# Patient Record
Sex: FEMALE | Race: WHITE | NOT HISPANIC OR LATINO | Employment: STUDENT | URBAN - METROPOLITAN AREA
[De-identification: names, ages, dates, MRNs, and addresses within clinical notes are randomized per-mention and may not be internally consistent; named-entity substitution may affect disease eponyms.]

---
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
## 2023-03-22 ENCOUNTER — MYC REFILL (OUTPATIENT)
Dept: FAMILY MEDICINE | Facility: CLINIC | Age: 18
End: 2023-03-22

## 2023-03-23 NOTE — TELEPHONE ENCOUNTER
Pending Prescriptions:                       Disp   Refills    FLUoxetine (PROZAC) 20 MG capsule                          Sig: Take 2 capsules (40 mg) by mouth daily    busPIRone (BUSPAR) 10 MG tablet                            Sig: Take 1 tablet (10 mg) by mouth 2 times daily    Routing refill request to provider for review/approval because:  Medication is reported/historical

## 2023-03-24 RX ORDER — BUSPIRONE HYDROCHLORIDE 10 MG/1
10 TABLET ORAL 2 TIMES DAILY
Status: CANCELLED | OUTPATIENT
Start: 2023-03-24

## 2023-03-24 NOTE — TELEPHONE ENCOUNTER
These are prescribed by her psychiatrist.    Janie Tai, Pediatric Nurse Practitioner   [facility]

## 2023-05-18 ENCOUNTER — OFFICE VISIT (OUTPATIENT)
Dept: FAMILY MEDICINE | Facility: CLINIC | Age: 18
End: 2023-05-18
Payer: COMMERCIAL

## 2023-05-18 VITALS
RESPIRATION RATE: 14 BRPM | BODY MASS INDEX: 30.82 KG/M2 | WEIGHT: 185 LBS | TEMPERATURE: 99.1 F | OXYGEN SATURATION: 100 % | HEART RATE: 104 BPM | DIASTOLIC BLOOD PRESSURE: 74 MMHG | SYSTOLIC BLOOD PRESSURE: 122 MMHG | HEIGHT: 65 IN

## 2023-05-18 DIAGNOSIS — N91.2 AMENORRHEA: Primary | ICD-10-CM

## 2023-05-18 DIAGNOSIS — Z32.01 PREGNANCY TEST POSITIVE: ICD-10-CM

## 2023-05-18 LAB — HCG UR QL: POSITIVE

## 2023-05-18 PROCEDURE — 81025 URINE PREGNANCY TEST: CPT | Performed by: NURSE PRACTITIONER

## 2023-05-18 PROCEDURE — 99213 OFFICE O/P EST LOW 20 MIN: CPT | Performed by: NURSE PRACTITIONER

## 2023-05-18 NOTE — PROGRESS NOTES
"  Assessment & Plan     Amenorrhea    - HCG qualitative urine; Future  - HCG qualitative urine  - Ob/Gyn Referral; Future    Pregnancy test positive  Recommend starting prenatal, no alcohol.  Referred to Primary Care with Ob.     56}     BMI:   Estimated body mass index is 31.05 kg/m  as calculated from the following:    Height as of this encounter: 1.644 m (5' 4.72\").    Weight as of this encounter: 83.9 kg (185 lb).   Weight management plan: .        DEVANG Bass CNP [facility] Graves is a 17 year old, presenting for the following health issues:  Pregnancy Test        5/18/2023     2:47 PM   Additional Questions   Roomed by Willard COURTNEY   Accompanied by friend     History of Present Illness       Reason for visit:  Pregnancy test  Symptoms include:  Positive test at home last night. was suppost to start her period on 5/14/23. This was not planned.     She eats 2-3 servings of fruits and vegetables daily.She consumes 0 sweetened beverage(s) daily.She exercises with enough effort to increase her heart rate 30 to 60 minutes per day.  She exercises with enough effort to increase her heart rate 4 days per week. She is missing 1 dose(s) of medications per week.       Review of Systems   Constitutional, HEENT, cardiovascular, pulmonary, gi and gu systems are negative, except as otherwise noted.      Objective    /74   Pulse 104   Temp 99.1  F (37.3  C) (Temporal)   Resp 14   Ht 1.644 m (5' 4.72\")   Wt 83.9 kg (185 lb)   LMP 04/16/2023 (Exact Date)   SpO2 100%   BMI 31.05 kg/m    Body mass index is 31.05 kg/m .  Physical Exam   GENERAL: healthy, alert and no distress  NECK: no adenopathy, no asymmetry, masses, or scars and thyroid normal to palpation  RESP: lungs clear to auscultation - no rales, rhonchi or wheezes  CV: regular rate and rhythm, normal S1 S2, no S3 or S4, no murmur, click or rub, no peripheral edema and peripheral pulses strong  ABDOMEN: soft, " nontender, no hepatosplenomegaly, no masses and bowel sounds normal  MS: no gross musculoskeletal defects noted, no edema    Results for orders placed or performed in visit on 05/18/23   HCG qualitative urine     Status: Abnormal   Result Value Ref Range    hCG Urine Qualitative Positive (A) Negative

## 2023-05-23 ENCOUNTER — VIRTUAL VISIT (OUTPATIENT)
Dept: FAMILY MEDICINE | Facility: CLINIC | Age: 18
End: 2023-05-23
Payer: COMMERCIAL

## 2023-05-23 VITALS — BODY MASS INDEX: 30.79 KG/M2 | HEIGHT: 65 IN

## 2023-05-23 DIAGNOSIS — Z34.01 ENCOUNTER FOR SUPERVISION OF NORMAL FIRST PREGNANCY IN FIRST TRIMESTER: Primary | ICD-10-CM

## 2023-05-23 PROBLEM — F32.A DEPRESSIVE DISORDER: Status: ACTIVE | Noted: 2023-05-23

## 2023-05-23 PROBLEM — F41.9 ANXIETY: Status: ACTIVE | Noted: 2019-09-18

## 2023-05-23 PROBLEM — M54.2 CHRONIC NECK PAIN: Status: ACTIVE | Noted: 2019-09-18

## 2023-05-23 PROBLEM — G89.29 CHRONIC NECK PAIN: Status: ACTIVE | Noted: 2019-09-18

## 2023-05-23 PROBLEM — L70.9 ACNE: Status: ACTIVE | Noted: 2019-09-18

## 2023-05-23 PROBLEM — L67.1: Status: ACTIVE | Noted: 2019-09-18

## 2023-05-23 PROCEDURE — 99207 PR NO CHARGE NURSE ONLY: CPT | Mod: 93

## 2023-05-23 RX ORDER — VITAMIN A ACETATE, .BETA.-CAROTENE, ASCORBIC ACID, CHOLECALCIFEROL, .ALPHA.-TOCOPHEROL ACETATE, DL-, THIAMINE MONONITRATE, RIBOFLAVIN, NIACINAMIDE, PYRIDOXINE HYDROCHLORIDE, FOLIC ACID, CYANOCOBALAMIN, CALCIUM CARBONATE, FERROUS FUMARATE, ZINC OXIDE, AND CUPRIC OXIDE 2000; 2000; 120; 400; 22; 1.84; 3; 20; 10; 1; 12; 200; 27; 25; 2 [IU]/1; [IU]/1; MG/1; [IU]/1; MG/1; MG/1; MG/1; MG/1; MG/1; MG/1; UG/1; MG/1; MG/1; MG/1; MG/1
1 TABLET ORAL DAILY
COMMUNITY

## 2023-05-23 NOTE — PATIENT INSTRUCTIONS
"    Pregnancy: Your First Trimester Changes  The first trimester is a time of rapid development for your baby. Because your baby is growing so quickly, it is important that you start a healthy lifestyle right away. By the end of the first trimester, your baby has formed all of its major body organs and weighs just over an ounce.  Month 1 (weeks 1 to 4)  The placenta (the organ that nourishes your baby) begins to form. The brain, spinal cord, heart, gastrointestinal tract, and lungs begin to develop. Your baby is about   inch long by the end of the first month.     Actual size of baby is 1/4\".     Month 2 (weeks 5 to 8)  All of your baby s major body organs form. The face, fingers, toes, ears, and eyes appear. By the end of the month, your baby is about 1 inch long.     Actual size of baby is 1\".     Month 3 (weeks 9 to 12)  Your baby can open and close its fists and mouth. The sexual organs begin to form. As the first trimester ends, your baby is about 3 inches long.     Actual size of baby is 3\".     [employer] last reviewed this educational content on 8/1/2020 2000-2022 [employer]. All rights reserved. This information is not intended as a substitute for professional medical care. Always follow your healthcare professional's instructions.          Vitamin B6 (pyridoxine) Oral Tablet  Uses  This medicine is used for the following purposes:    metabolism disorder    nausea and vomiting    vitamin deficiency  Instructions  This medicine may be taken with or without food.  Store at room temperature away from heat, light, and moisture. Do not keep in the bathroom.  If you forget to take a dose on time, take it as soon as you remember. If it is almost time for the next dose, do not take the missed dose. Return to your normal dosing schedule. Do not take 2 doses of this medicine at one time.  Drug interactions can change how medicines work or increase risk for side effects. Tell your health care providers " about all medicines taken. Include prescription and over-the-counter medicines, vitamins, and herbal medicines. Speak with your doctor or pharmacist before starting or stopping any medicine.  Speak with your doctor or pharmacist before starting any other vitamins.  It is very important that you follow your doctor's instructions for all blood tests.  Cautions  Tell your doctor and pharmacist if you ever had an allergic reaction to a medicine.  Do not use the medication any more than instructed.  Tell the doctor or pharmacist if you are pregnant, planning to be pregnant, or breastfeeding.  Side Effects  If you have any of the following side effects, you may be getting too much medicine. Please contact your doctor to let them know about these side effects.    drowsiness or sedation    numbness or tingling in hands and feet    headaches    nausea    stomach upset or abdominal pain  This medicine usually has no side effects.  A few people may have an allergic reaction to this medicine. Symptoms can include difficulty breathing, skin rash, itching, swelling, or severe dizziness. If you notice any of these symptoms, seek medical help quickly.  Extra  Please speak with your doctor, nurse, or pharmacist if you have any questions about this medicine.  [email or web address]  IMPORTANT NOTE: This document tells you briefly how to take your medicine, but it does not tell you all there is to know about it. Your doctor or pharmacist may give you other documents about your medicine. Please talk to them if you have any questions. Always follow their advice. There is a more complete description of this medicine available in English. Scan this code on your smartphone or tablet or use the web address below. You can also ask your pharmacist for a printout. If you have any questions, please ask your pharmacist. The display and use of this drug information is subject to Terms of Use. Copyright(c) 2022 [employer]     3515-2308 [employer]. All rights reserved. This information is not intended as a substitute for professional medical care. Always follow your healthcare professional's instructions.          Adapting to Pregnancy: First Trimester  As your body adjusts during your first trimester of pregnancy, you may have to change or limit your daily activities. You ll need more rest. You may also need to use the energy you have more wisely.   Your changing body  Almost every part of your body is affected as you adapt to pregnancy. The uterus and cervix will start to soften right away. You may not look very pregnant during the first 3 months. But you are likely to have some common signs of early pregnancy:     Nausea    Fatigue    Frequent urination    Mood swings    Bloating of the belly    Constipation    Heartburn    Missed or light periods (first trimester bleeding)    Nipple or breast tenderness and breast swelling  It s not too late to start good habits   What matters most is protecting your baby from this moment on. If you smoke, drink alcohol, or use drugs, now is the time to stop. If you need help, talk with your healthcare provider:     Smoking increases the risk of stillbirth or having a low-birth-weight baby. If you smoke, quit now.    Alcohol and drugs have been linked with miscarriage, birth defects, intellectual disability, and low birth weight. Don't drink alcohol or take drugs.  Tips to relieve nausea  During pregnancy, nausea can happen at any time of the day, but it may be worse in the morning. To help prevent nausea:     Eat small, light meals at frequent intervals.    Drink fluids often.    Get up slowly. Eat a few unsalted crackers before you get out of bed.    Avoid smells that bother you.    Avoid spicy and fatty foods.    Eat an ice pop in your favorite flavor.    Get plenty of rest.    Ask your healthcare provider about taking shaina or vitamin B6 for nausea and vomiting.    Talk  with your healthcare provider if you take vitamins that upset your stomach.   Work concerns  The end of the first trimester is a good time to discuss working during pregnancy with your employer. Follow your healthcare provider s advice if your job needs you to stand for a long time, work with hazardous tools, or even sit at a desk all day. Your workspace, workload, or scheduled hours may need to be adjusted. Perhaps you can change body postures more often or take an extra break.   Advice for travel  Talk to your healthcare provider first, but the second trimester may be the best time for any travel. You may be advised to avoid certain trips while you re pregnant. Food and water can be concerns in developing countries. Travel by car is a good choice, as you can stop, get out, and stretch. Bring snacks and water along. Fasten the lap belt below your belly, low over your hips. Also be sure to wear the shoulder harness.   Intimacy  Unless your healthcare provider tells you to, there's no reason to stop having sex while you re pregnant. You or your partner may notice changes in desire. Desire may be less in the first trimester, due to nausea and fatigue. In the second trimester, sex may be very enjoyable. The third trimester can be a challenge comfort-wise. Try different positions and see what s best for you both.   [employer] last reviewed this educational content on 4/1/2020 2000-2022 [employer]. All rights reserved. This information is not intended as a substitute for professional medical care. Always follow your healthcare professional's instructions.          Pregnancy: Common Questions  There are plenty of myths and  old wives  tales  about pregnancy. You may need help  fact from fiction. On this sheet, you ll find answers to a few common questions. If you have other questions, talk with your healthcare provider.    Will working harm my baby?  In most cases, working throughout your  pregnancy is not harmful at all. There may be concerns if the job involves dangerous machinery or chemicals, lifting, or standing for very long periods of time. Talk with your healthcare provider and employer about your particular job and pregnancy.  Is it safe to have sex during pregnancy?  Yes, unless you are specifically advised not to by your healthcare provider.  Why can t I change the cat litter box?  Cats carry a disease called toxoplasmosis. In adult humans, it shows up as a mild infection of the blood and organs. If you are infected during pregnancy, the baby s brain and eyes could be damaged. To be safe, have someone else change the litter. If you must handle it, wear a paper mask over your nose and mouth. Also, wear gloves and wash your hands afterward.  Which medicines are safe?  No prescription or over-the-counter medicine is safe for everyone all of the time. But sometimes medicines are needed. Be sure your healthcare provider knows you are pregnant. Then use only the medicines he or she advises you to take.  Is it true that I can overheat my baby?  Yes. To prevent making your baby too warm:    Don t sit in a Jacuzzi. A long, warm bath is fine, but not in water over 100 F (37.7 C).    Exercise less intensely if you feel tired. Base your workout on how you feel, not your heart rate. Heart rates aren t a good way to measure effort during pregnancy.  Can I lift and carry safely?  Yes, if your healthcare provider doesn t tell you otherwise. Learn to lift and carry safely to prevent injury and reduce back pain during pregnancy. To protect your back:    Bend at the knees to bring the load nearer.    Get a good . Test the weight of the load.    Tighten your belly. Exhale as you lift.    Lift with your legs, not with your back.    Carry the load close to your body.    Hold the load so you can see where you are going.  What if I get sick?  Most women get sick at least once during pregnancy. Talk with your  healthcare provider if you do. Most likely it will not affect your pregnancy. Get plenty of rest and fluids, and eat what you can. Talk with your provider before taking any medicines.  [employer] last reviewed this educational content on 8/1/2020 2000-2022 [employer]. All rights reserved. This information is not intended as a substitute for professional medical care. Always follow your healthcare professional's instructions.          Comfort Tips During Pregnancy  Pregnancy can bring discomfort of different kinds. Below are tips for ways to feel better. Talk with your healthcare provider before using pain-relieving medicine at any time during your pregnancy.    First trimester tips  Easing nausea    Get up slowly. Eat a few unsalted crackers before you get out of bed.    Avoid smells that bother you.    Eat small, bland, low-fat, high-protein meals at frequent intervals.    Sip on water, weak tea, or clear soft drinks, like ginger ale. Eat ice chips.    Try taking vitamin B6.  Coping with fatigue    Take catnaps when you can.    Get regular exercise.    Accept help from others.    Practice good sleep habits, like going to bed and getting up at the same time each day. Use your bed only for sleep and sex.  Calming mood swings    Talk about your feelings with others, including other mothers.    Limit sugar, chocolate, and caffeine.    Eat a healthy diet. Don t skip meals.    Get regular exercise.  Soothing headaches    Get fresh air and exercise.    Relax and get enough rest.    Check with your healthcare provider before taking any pain medicines.    Second trimester tips    To limit ankle swelling, sit with your feet raised or wear support hose.    If you have pain in your groin and stomach (round ligament pain), don't make sudden twisting movements with your body.    For leg cramps, flexing your foot often brings immediate relief. Also try massaging your calf in long, downward strokes, or stretching  your legs before going to bed. Get enough exercise and wear shoes with flexible soles. Eat foods rich in calcium.    Third trimester tips  Reducing heartburn    Eat small, light meals throughout the day rather than 3 large ones.    Sleep with your upper body raised 6 inches. Don t lie down until 2 hours after you eat.    Don't eat greasy, fried, or spicy foods.    Don't have citrus fruits or juices.  Treating constipation    Eat foods high in fiber, such as whole-grain foods, and fresh fruit and vegetables).    Drink plenty of water.    Get regular exercise.    Ask about your healthcare provider about medicines that have docusate or psyllium.  Taking care of your breasts    Don't use harsh soaps or alcohol, which can make your skin too dry.    Wear nursing bras. They provide more support than regular bras and can be used after pregnancy if you breastfeed.  Getting a good night s sleep    Take a warm shower before bed.    Sleep on a firm mattress.    Lie on your side with 1 leg crossed over the other.    Use pillows to support your arms, legs, and belly.    [employer] last reviewed this educational content on 8/1/2020 2000-2022 [employer]. All rights reserved. This information is not intended as a substitute for professional medical care. Always follow your healthcare professional's instructions.          Folic Acid Supplements  Folic acid is also called folate. It is one of the B vitamins. Nutrition experts are just starting to learn more about how folic acid helps the body. It is needed to prevent a shortage of red blood cells (a type of anemia). Experts have also found that folic acid can prevent some birth defects.     How much folic acid do you need?  Adults should have 400 mcg (micrograms) of folic acid each day. Adults may need more if they are planning to get pregnant, are pregnant, or are breastfeeding. Talk with your healthcare provider to find the right amount of folic acid for you.   Why  use a supplement?  Taking folic acid both before and during pregnancy is important. This can prevent birth defects of the spine and brain (neural tube defects). A supplement may also be helpful if you drink alcohol often. You may want to use a folic acid supplement if any of the following is true for you:   ? I am a person of childbearing age.   ? I am planning to get pregnant.  ? I rarely eat leafy green vegetables, dried beans, or lentils.   ? I rarely eat cereal and whole grains (wheat germ, brown rice, whole-wheat bread).  ? I often have more than 1 drink of alcohol a day.   If you take folic acid  Here are some tips to help you get the most from a folic acid supplement:    Read the label to be sure the product won't  soon.    Choose a supplement that provides 400 to 800 mcg of folic acid.    Store the supplement in a cool, dry place, away from sun and heat.    Eat a healthy diet to get all the nutrients your body needs.  Foods that have folic acid  Folic acid is found mainly in plants. Some good sources include:    Dark green leafy vegetables such as spinach, kale, collards, and boom lettuce    Lentils, chickpeas, and dried beans such as martinez, kidney, and black beans    Asparagus, bok dagmar, broad-beans, broccoli, and [place] sprouts    Avocados, oranges, and orange juice    Wheat germ and whole-wheat products    Products fortified with folic acid, such as cereal, pasta, bread, and rice  [email or web address] last reviewed this educational content on 2022-2022 [employer]. All rights reserved. This information is not intended as a substitute for professional medical care. Always follow your healthcare professional's instructions.          Anemia During Pregnancy  Anemia is a condition in which the red blood cell count is too low. In pregnant women, this is often caused by not having enough iron in the blood. Anemia is common in pregnancy and very easy to treat.   Why you need iron   While  pregnant, your body uses iron to make red blood cells for you and your baby. These cells bring oxygen to your baby and to the rest of your body. Not having enough red blood cells can cause your baby to be born too small. But this is rare, as it s easy for you to get enough iron.   Testing for anemia   The only way to know if you have anemia is to have a simple test called a CBC (complete blood count). This is a routine test that will be done at one of your first prenatal visits. This test may be done again, at about week 26 to week 28.   Treating anemia   If you have anemia due to low iron content, follow the advice of your healthcare provider. Eating foods high in iron and taking supplements can help you get the iron you need.   Eating foods high in iron      Green leafy vegetables and nuts are a good source of iron.     Eat foods that are high in iron such as:     Red meat (limit organ meats such as liver)    Seafood (be sure it s fully cooked), and don't eat fish that are high in mercury, such as swordfish, tilefish, christophe mackerel, and shark    Tofu    Eggs    Green, leafy vegetables    Whole grains and iron-fortified cereals    Dried fruits and nuts  Taking iron supplements   In most cases, a prenatal vitamin can provide enough iron. But if you need more, your healthcare provider may prescribe an iron supplement. Swallow iron pills with a glass of orange or cranberry juice. The vitamin C in these fruit juices can help your body absorb iron. But don t take your iron pills with juices that have calcium added to them. They can keep your body from absorbing the iron.   Iron supplements   Iron supplements may have certain side effects. They may cause your stools to turn black, and make you feel sick to your stomach or constipated. Here are some tips that may help you limit side effects:     Start slowly. Take 1 pill a day for a few days. Then work up to your prescribed dose over time.    Take your pills with meals,  and not at bedtime.    Increase the fiber in your diet. Eat more whole grains, fruits, and vegetables.    Do mild exercise each day.    If advised by your healthcare provider, take a stool softener.  [employer] last reviewed this educational content on 2/1/2020 2000-2022 [employer]. All rights reserved. This information is not intended as a substitute for professional medical care. Always follow your healthcare professional's instructions.          Rh-Negative Screening  If you have Rh-negative blood, your baby may be at risk for health problems. This is true only if your baby has Rh-positive blood. A simple test followed by treatment can help prevent problems.   What are the risks?  If the blood of your baby is Rh positive, your Rh-negative blood may form antibodies. These antibodies will attack the Rh-positive blood. This is called Rh disease. Rh disease can cause your baby to lose blood cells or have other health problems. Medical treatment can prevent Rh disease by keeping antibodies from forming.   How are you tested?  A simple blood test shows if you re Rh negative. This test is done very early in your pregnancy. If you re Rh negative, you ll have a second blood test near week 28 of pregnancy. This test will check whether or not your blood contains Rh antibodies.     [employer] last reviewed this educational content on 4/1/2020 2000-2022 [employer]. All rights reserved. This information is not intended as a substitute for professional medical care. Always follow your healthcare professional's instructions.          What Is Prenatal Care?  Before getting pregnant, you may have added some good health habits to get ready for your baby. But if you didn t, start today. One of the first steps is learning how to take care of yourself. See your healthcare provider as soon as you think you may be pregnant. Then continue prenatal care during your pregnancy.     Prenatal care helps you  have a healthy baby   During prenatal care:    Your healthcare provider checks the health of your pregnancy. They'll calculate a due date. This gives an estimate of your baby's delivery. Many people give birth between 38 and 41 weeks of pregnancy. Your due date is found by counting 40 weeks from the first day of your last menstrual period.    Your pregnancy's progress is checked. This includes your baby s growth, fetal heart rate, changes in your weight and blood pressure, and your overall health and comfort.    Your provider may find new concerns and manage current ones before problems happen.    Your provider will check lab work through blood and urine.    Your provider will talk about normal changes that happen during pregnancy. They'll also talk about changes that may not be normal. And they'll advise you about lifestyle changes.    Your provider will answer your questions. They'll also help you get ready for labor and delivery.  You're part of a team  When you re pregnant, you re part of a team. This team includes you, your baby, and your provider. It also may include a partner or a main support person. That could be a loved one, such as a spouse, a family member, or a friend. As you work to give your baby a healthy start, rely on your team members for support.   It s not too late to start good habits   What matters most is protecting your baby from this moment on. If you smoke, drink alcohol, or use illegal drugs, now's the time to stop. If you need help, talk with your healthcare provider.     Smoking increases the risk of losing your baby. Or of having a low-birth-weight baby. If you smoke, quit now.    Alcohol and drugs have been linked with many problems. These include miscarriage, birth defects, intellectual disability, and low birth weight. Stay away from alcohol and drugs.    Eat a healthy diet. This helps keep you and your baby strong and healthy. Follow your provider's instructions for nutrition. Also  stay within the guidelines you're given for healthy weight gain.    Take 400 micrograms to 800 micrograms (400 mcg to 800 mcg or 0.4 mg to 0.8 mg) of folic acid every day. Take it for at least 1 month before getting pregnant. And keep taking it for the first trimester of your pregnancy. This is to lower your risk of some brain and spinal birth defects. You can get folic acid from some foods. But it's hard to get all the folic acid you'll need from foods alone. Talk with your provider about taking a folic acid supplement.    Regular exercise will help you stay fit and feel good during pregnancy. It can also help prevent or reduce back pain. Talk with your provider about how to exercise safely during pregnancy.    If you have a health condition, make sure it's under control. Some conditions include asthma, diabetes, depression, high blood pressure, obesity, thyroid disease, or epilepsy. Be sure your vaccines are up to date.  How daily issues affect your health  Many things in your daily life impact your health. This can include transportation, money problems, housing, access to food, and . If you can t get to medical appointments, you may not receive the care you need. When money is tight, it may be difficult to pay for medicines. And living far from a grocery store can make it hard to buy healthy food.   If you have concerns in any of these or other areas, talk with your healthcare team. They may know of local resources to assist you. Or they may have a staff person who can help.   [employer] last reviewed this educational content on 8/1/2020 2000-2022 [employer]. All rights reserved. This information is not intended as a substitute for professional medical care. Always follow your healthcare professional's instructions.          Understanding Intimate Partner Violence  Intimate partner violence (IPV) affects hundreds of thousands of women. But the true number may be much higher because many  women may not report it. Partner violence often starts or gets worse during and after pregnancy. This may be from the stress of pregnancy and a new baby. IPV can result in pregnancy complications, poor postpartum care, and poor health of the baby.  You may feel alone if you are experiencing intimate partner violence during or after your pregnancy, but know that you re not. It s far more common than you think. And there s help and hope. Talk with your healthcare provider if you are not safe.  Types of intimate partner violence  Most people think of IPV as just physical abuse. While it does often include physical abuse, IPV can be emotional and sexual abuse. These other forms of abuse are sometimes harder to see. This is especially true for emotional abuse, because it can distort your own viewpoint.    Physical abuse includes using physical force to hurt or disable a partner. It can include throwing objects, pushing, kicking, biting, slapping, strangling, hitting, or beating.    Emotional abuse includes making threats, and controlling money or other resources. It s anything that erodes a person s sense of self-worth. It may look like name-calling, blaming, and stalking. It can also include isolation from friends, family, and even access to healthcare.    Sexual violence includes rape, unwanted kissing, and forced touching. It also includes reproductive coercion. This is holding power and control over the woman s reproductive health. It may look like efforts to sabotage contraception, having unsafe sex to purposefully expose the woman to sexually transmitted infections (STIs). It may also include forced  or injuries to cause a miscarriage.  Are you at risk for IPV?  IPV affects all genders, races, ethnicities, and social and economic statuses. But some people are at greater risk for being a victim or an abuser. Certain factors can increase the risk for IPV.  Individual factors    Having low self-esteem, being  "emotional dependent, insecure, and jealous    Having low income or being unemployed, having recent job loss or job instability    Being younger    Using alcohol or drugs    Being depressed, angry, having PTSD, or being hostile towards women    Having a history of abusing others, being abused, or witnessing abuse    Having poor problem solving skills    Having poor impulse control    Being a Native , , or  woman  Relationship factors    Marital or relationship conflict, frequent fighting    Having a jealous or possessive partner    Having control issues    Being under economic stress    Having poor social support    Having income inequality    It often helps to ask yourself these questions from the [employer] of [employer] to know if you are in an abusive relationship:    Does my partner always put me down and make me feel bad about myself?    Has my partner caused harm or pain to my body?    Does my partner threaten me, the baby, my other children or himself?    Does my partner blame me for his actions? Does he tell me it's my own fault he hit me?    Is my partner becoming more violent as time goes on?    Has my partner promised never to hurt me again, but still does?  If you answered \"yes\" to any of these questions, you may be in an unhealthy relationship.  If you recognize yourself or your partner in any of these descriptors, talk with your healthcare provider to get help. If you are in danger, he or she can help you develop a safety plan.  Health effects  IPV during or after pregnancy can cause physical and emotional health effects, pregnancy complications, poor outcomes, and injury and harm to the baby after birth.  During pregnancy    Physical injuries such as bruises, cuts, or broken bones    Vaginal bleeding or pelvic pain    Early labor and delivery    Tearing of the placenta (placental abruption)    Maternal death  Infant health    Low infant birth weight    Infant who needs NICU " care    Broken bones    Death of   After birth  After birth, the mother may:    Have pain and other long-term health conditions    Develop postpartum depression (2 to 3 times greater risk)    Have high-risk sexual behaviors    Use harmful substances    Have unhealthy diet and lifestyle such as eating disorders  Abuse is never OK. One in 6 abused women are first abused during pregnancy, when it s dangerous to both you and your developing baby. Recognizing that you are in an abusive relationship is the first step to help. See your healthcare provider or someone else you trust who can help you develop a safety plan.  What to expect from your healthcare provider  It can be a hard to bring up partner violence with your healthcare provider. But it s an important first step in getting help. Rest assured, your conversation is confidential. He or she is a non-judgmental health professional. He or she likely has other patients with similar problems and recognizes the difficulty of the situation. Your provider may ask you questions such as:    Do you feel safe in your relationship?    Do you have a safe place to go in an emergency?    Do conflicts ever turn into physical fights?  Answer honestly and completely. There will be no pressure to disclose the abuse or to press charges. He or she won t ask about the abuse in front of a partner, friends, or family. The goal is to help you access help when you are ready.  Call   If you feel your safety is in jeopardy now, call or the police.  For more help  If the person who hurt you is your partner or spouse and your situation can become dangerous again, it's vital to make a safety plan. The National Domestic Violence Hotline can help you develop a plan that meets your personal situation.    National Domestic Violence Hotline , www.theDealflicks.org, 402.325.8449  [place] last reviewed this educational content on 2020-2022 [employer]. All rights  reserved. This information is not intended as a substitute for professional medical care. Always follow your healthcare professional's instructions.          Bleeding During Early Pregnancy   If you ve had bleeding early in your pregnancy, you re not alone. Many other pregnant women have early bleeding, too. And in most cases, nothing is wrong. But your healthcare provider still needs to know about it. They may want to do tests to find out why you re bleeding. Call your provider if you see bleeding during pregnancy. Tell your provider if your blood is Rh negative. Then they can figure out if you need anti-D immune globulin treatment.   What causes early bleeding?   The cause of bleeding early in pregnancy is often unknown. But many factors early on in pregnancy may lead to light bleeding (called spotting) or heavier bleeding. These include:     Having sex    When the embryo implants on the uterine wall    Bleeding between the sac membrane and the uterus (subchorionic bleeding)    Pregnancy loss (miscarriage)    The embryo implants outside of the uterus (ectopic pregnancy)  If you see spotting   Light bleeding is the most common type of bleeding in early pregnancy. If you see it, call your healthcare provider. Chances are, they will tell you that you can care for yourself at home.   If tests are needed   Depending on how much you bleed, your healthcare provider may ask you to come in for some tests. A pelvic exam, for instance, can help see how far along your pregnancy is. You also may have an ultrasound or a Doppler test. These imaging tests use sound waves to check the health of your baby. The ultrasound may be done on your belly or inside your vagina. You may also need a special blood test. This test compares your hormone levels in blood samples taken 2 days apart. The results can help your provider learn more about the implantation of the embryo. Your blood type will also need to be checked to assess if you will  need to be treated for Rh sensitization.       Ultrasound can help check the health of your fetus.     Warning signs   If your bleeding doesn t stop or if you have any of the following, get medical care right away:     Soaking a sanitary pad each hour    Bleeding like you re having a period    Cramping or severe belly pain    Feeling dizzy or faint    Tissue passing through your vagina    Bleeding at any time after the first trimester  Questions you may be asked   Bleeding early in pregnancy isn't normal. But it is common. If you ve seen any bleeding, you may be concerned. But keep in mind that bleeding alone doesn t mean something is wrong. Just be sure to call your healthcare provider right away. They may ask you questions like these to help find the cause of your bleeding:     When did your bleeding start?    Is your bleeding very light or is it like a period?    Is the blood bright red or brownish?    Have you had sex recently?    Have you had pain or cramping?    Have you felt dizzy or faint?  Monitoring your pregnancy   Bleeding will often stop as quickly as it began. Your pregnancy may go on a normal path again. You may need to make a few extra prenatal visits. But you and your baby will most likely be fine.   [place] last reviewed this educational content on 1/1/2022 2000-2022 [employer]. All rights reserved. This information is not intended as a substitute for professional medical care. Always follow your healthcare professional's instructions.

## 2023-05-23 NOTE — PROGRESS NOTES
OB intake phone visit with verbal permission.    COVID infection not recentt and vaccinated- Pfizer

## 2023-06-11 LAB
ABO/RH(D): NORMAL
ANTIBODY SCREEN: NEGATIVE
SPECIMEN EXPIRATION DATE: NORMAL

## 2023-06-12 ENCOUNTER — HOSPITAL ENCOUNTER (OUTPATIENT)
Dept: ULTRASOUND IMAGING | Facility: CLINIC | Age: 18
Discharge: HOME | End: 2023-06-12
Attending: FAMILY MEDICINE | Admitting: FAMILY MEDICINE
Payer: COMMERCIAL

## 2023-06-12 ENCOUNTER — ANCILLARY ORDERS (OUTPATIENT)
Dept: FAMILY MEDICINE | Facility: CLINIC | Age: 18
End: 2023-06-12

## 2023-06-12 ENCOUNTER — LAB (OUTPATIENT)
Dept: LAB | Facility: CLINIC | Age: 18
End: 2023-06-12
Payer: COMMERCIAL

## 2023-06-12 DIAGNOSIS — Z34.01 ENCOUNTER FOR SUPERVISION OF NORMAL FIRST PREGNANCY IN FIRST TRIMESTER: ICD-10-CM

## 2023-06-12 LAB
ERYTHROCYTE [DISTWIDTH] IN BLOOD BY AUTOMATED COUNT: 13.2 % (ref 10–15)
HCG INTACT+B SERPL-ACNC: ABNORMAL MIU/ML
HCT VFR BLD AUTO: 38.8 % (ref 35–47)
HGB BLD-MCNC: 12.9 G/DL (ref 11.7–15.7)
MCH RBC QN AUTO: 28 PG (ref 26.5–33)
MCHC RBC AUTO-ENTMCNC: 33.2 G/DL (ref 31.5–36.5)
MCV RBC AUTO: 84 FL (ref 78–100)
PLATELET # BLD AUTO: 268 10E3/UL (ref 150–450)
RBC # BLD AUTO: 4.61 10E6/UL (ref 3.8–5.2)
WBC # BLD AUTO: 11 10E3/UL (ref 4–11)

## 2023-06-12 PROCEDURE — 86803 HEPATITIS C AB TEST: CPT

## 2023-06-12 PROCEDURE — 87340 HEPATITIS B SURFACE AG IA: CPT

## 2023-06-12 PROCEDURE — 86762 RUBELLA ANTIBODY: CPT

## 2023-06-12 PROCEDURE — 86901 BLOOD TYPING SEROLOGIC RH(D): CPT

## 2023-06-12 PROCEDURE — 85027 COMPLETE CBC AUTOMATED: CPT

## 2023-06-12 PROCEDURE — 36415 COLL VENOUS BLD VENIPUNCTURE: CPT

## 2023-06-12 PROCEDURE — 86850 RBC ANTIBODY SCREEN: CPT

## 2023-06-12 PROCEDURE — 86900 BLOOD TYPING SEROLOGIC ABO: CPT

## 2023-06-12 PROCEDURE — 86780 TREPONEMA PALLIDUM: CPT

## 2023-06-12 PROCEDURE — 76801 OB US < 14 WKS SINGLE FETUS: CPT

## 2023-06-12 PROCEDURE — 84702 CHORIONIC GONADOTROPIN TEST: CPT

## 2023-06-12 PROCEDURE — 87389 HIV-1 AG W/HIV-1&-2 AB AG IA: CPT

## 2023-06-12 PROCEDURE — 87086 URINE CULTURE/COLONY COUNT: CPT

## 2023-06-13 LAB
HBV SURFACE AG SERPL QL IA: NONREACTIVE
HCV AB SERPL QL IA: NONREACTIVE
HIV 1+2 AB+HIV1 P24 AG SERPL QL IA: NONREACTIVE
RUBV IGG SERPL QL IA: 1.46 INDEX
RUBV IGG SERPL QL IA: POSITIVE
T PALLIDUM AB SER QL: NONREACTIVE

## 2023-06-14 LAB — BACTERIA UR CULT: NO GROWTH

## 2023-07-12 ENCOUNTER — PRENATAL OFFICE VISIT (OUTPATIENT)
Dept: FAMILY MEDICINE | Facility: CLINIC | Age: 18
End: 2023-07-12
Payer: COMMERCIAL

## 2023-07-12 VITALS
HEIGHT: 65 IN | WEIGHT: 189 LBS | DIASTOLIC BLOOD PRESSURE: 68 MMHG | SYSTOLIC BLOOD PRESSURE: 120 MMHG | RESPIRATION RATE: 16 BRPM | OXYGEN SATURATION: 100 % | TEMPERATURE: 98 F | HEART RATE: 88 BPM | BODY MASS INDEX: 31.49 KG/M2

## 2023-07-12 DIAGNOSIS — O21.9 NAUSEA AND VOMITING DURING PREGNANCY: Primary | ICD-10-CM

## 2023-07-12 DIAGNOSIS — Z34.90 NORMAL PREGNANCY, ANTEPARTUM: ICD-10-CM

## 2023-07-12 LAB
CLUE CELLS: ABNORMAL
TRICHOMONAS, WET PREP: ABNORMAL
WBC'S/HIGH POWER FIELD, WET PREP: ABNORMAL
YEAST, WET PREP: PRESENT

## 2023-07-12 PROCEDURE — 87591 N.GONORRHOEAE DNA AMP PROB: CPT | Performed by: FAMILY MEDICINE

## 2023-07-12 PROCEDURE — 36415 COLL VENOUS BLD VENIPUNCTURE: CPT | Performed by: FAMILY MEDICINE

## 2023-07-12 PROCEDURE — 99214 OFFICE O/P EST MOD 30 MIN: CPT | Performed by: FAMILY MEDICINE

## 2023-07-12 PROCEDURE — 87210 SMEAR WET MOUNT SALINE/INK: CPT | Performed by: FAMILY MEDICINE

## 2023-07-12 PROCEDURE — 87491 CHLMYD TRACH DNA AMP PROBE: CPT | Performed by: FAMILY MEDICINE

## 2023-07-12 RX ORDER — ONDANSETRON 4 MG/1
4 TABLET, ORALLY DISINTEGRATING ORAL EVERY 6 HOURS PRN
Qty: 20 TABLET | Refills: 0 | Status: ON HOLD | OUTPATIENT
Start: 2023-07-12 | End: 2024-01-12

## 2023-07-12 ASSESSMENT — PAIN SCALES - GENERAL: PAINLEVEL: NO PAIN (0)

## 2023-07-12 NOTE — PROGRESS NOTES
"  SUBJECTIVE:     HPI:    This is a 17 year old female patient,  who presents for her first obstetrical visit.    KENNEDY: 2024, by Last Menstrual Period.  She is 12w3d weeks.  Her cycles are regular.  Her last menstrual period was abnormal.   Since her LMP, she has experienced  nausea, abdominal pain, fatigue, headache, loss of appetite and constipation).   She denies emesis, vaginal discharge, dysuria, pelvic pain, urinary urgency, lightheadedness, urinary frequency, vaginal bleeding and hemorrhoids.    Additional History:     Childhood asthma - last inhaler   Heavy periods, on iron infusions, last year  FOB involved      Have you travelled during the pregnancy?No  Have your sexual partner(s) travelled during the pregnancy?No      HISTORY:   Planned Pregnancy: No  Marital Status: Single  Occupation: CNA  Living in Household: Parents/Siblings    Past History:  Her past medical history   Past Medical History:   Diagnosis Date    Anemia     Depressive disorder     Mild persistent asthma     resolved    Pneumonia due to Mycoplasma pneumoniae 2007    hospitalized    Primary nocturnal enuresis     Unspecified sinusitis (chronic)    .      She has a history of  none    Since her last LMP she denies use of alcohol, tobacco and street drugs.    Past medical, surgical, social and family history were reviewed and updated in [facility].        Current Outpatient Medications   Medication    busPIRone (BUSPAR) 10 MG tablet    FLUoxetine (PROZAC) 20 MG capsule    ondansetron (ZOFRAN ODT) 4 MG ODT tab    Prenatal Vit-Fe Fumarate-FA (PNV PRENATAL PLUS MULTIVITAMIN) 27-1 MG TABS per tablet     No current facility-administered medications for this visit.       ROS:   12 point review of systems negative other than symptoms noted below or in the HPI.      OBJECTIVE:     EXAM:  /68   Pulse 88   Temp 98  F (36.7  C) (Temporal)   Resp 16   Ht 1.644 m (5' 4.72\")   Wt 85.7 kg (189 lb)   LMP 2023 (Exact Date)  "  SpO2 100%   BMI 31.72 kg/m   Body mass index is 31.72 kg/m .    GENERAL: healthy, alert and no distress  EYES: Eyes grossly normal to inspection, PERRL and conjunctivae and sclerae normal  HENT: ear canals and TM's normal, nose and mouth without ulcers or lesions  NECK: no adenopathy, no asymmetry, masses, or scars and thyroid normal to palpation  RESP: lungs clear to auscultation - no rales, rhonchi or wheezes  BREAST: normal without masses, tenderness or nipple discharge and no palpable axillary masses or adenopathy  CV: regular rate and rhythm, normal S1 S2, no S3 or S4, no murmur, click or rub, no peripheral edema and peripheral pulses strong  ABDOMEN: soft, nontender, no hepatosplenomegaly, no masses and bowel sounds normal   (female): normal female external genitalia, normal urethral meatus, vaginal mucosa pink, moist, well rugated, and normal cervix/adnexa/uterus without masses or discharge.  Adequate pelvis  MS: no gross musculoskeletal defects noted, no edema  SKIN: no suspicious lesions or rashes  NEURO: Normal strength and tone, mentation intact and speech normal  PSYCH: mentation appears normal, affect normal/bright    ASSESSMENT/PLAN:       ICD-10-CM    1. Nausea and vomiting during pregnancy  O21.9 ondansetron (ZOFRAN ODT) 4 MG ODT tab      2. Normal pregnancy, antepartum  Z34.90 Non Invasive Prenatal Test Cell Free DNA     Non Invasive Prenatal Test Cell Free DNA     Wet prep - Clinic Collect     Neisseria gonorrhoeae PCR - Clinic Collect     Chlamydia trachomatis PCR - Clinic Collect          17 year old , 12w3d weeks of pregnancy with KENNEDY of 2024, by Last Menstrual Period    Discussed as follows:    screening.  She wishes to proceed with check testing.  Form signed.  Mild nausea.  Would like to try Zofran as well as other home measures.  Meds sent.  We will get all of her OB visits scheduled.    Counseling given:   - Follow up in 4-6 weeks for return OB visit.  - Recommended  weight gain for pregnancy: 15-25 lbs.          PLAN/PATIENT INSTRUCTIONS:          Marcelino Garrido MD

## 2023-07-13 LAB
C TRACH DNA SPEC QL NAA+PROBE: NEGATIVE
N GONORRHOEA DNA SPEC QL NAA+PROBE: NEGATIVE

## 2023-07-19 LAB — SCANNED LAB RESULT: NORMAL

## 2023-08-11 ENCOUNTER — PRENATAL OFFICE VISIT (OUTPATIENT)
Dept: FAMILY MEDICINE | Facility: CLINIC | Age: 18
End: 2023-08-11
Payer: COMMERCIAL

## 2023-08-11 VITALS
HEART RATE: 88 BPM | RESPIRATION RATE: 16 BRPM | OXYGEN SATURATION: 100 % | TEMPERATURE: 97.3 F | HEIGHT: 65 IN | BODY MASS INDEX: 31.36 KG/M2 | SYSTOLIC BLOOD PRESSURE: 128 MMHG | WEIGHT: 188.2 LBS | DIASTOLIC BLOOD PRESSURE: 50 MMHG

## 2023-08-11 DIAGNOSIS — Z34.90 NORMAL PREGNANCY, ANTEPARTUM: ICD-10-CM

## 2023-08-11 DIAGNOSIS — O21.9 NAUSEA AND VOMITING DURING PREGNANCY: Primary | ICD-10-CM

## 2023-08-11 PROCEDURE — 99207 PR PRENATAL VISIT: CPT | Performed by: FAMILY MEDICINE

## 2023-08-11 ASSESSMENT — PAIN SCALES - GENERAL: PAINLEVEL: NO PAIN (0)

## 2023-08-11 NOTE — PROGRESS NOTES
Concerns:   Doing well.  No concerns today.  Reportable signs and symptoms discussed.  Will schedule anatomy ultrasound.  RTC 4 weeks.    Pregnancy Issues   1. Childhood asthma   2. History of menorrhagia needing Fe transfusions          Prenatal care plan              - prior deliveries: none  -OB Labs of concern: none. cfDNA NL   - Rh- pos              - First trimester screening:  Discussed completed              - Second trimester screening:  Discussed completed              - Labor pain management:  ?              - Ped:  RH              - Circumcision if boy: Yes              - BC: unsure              - Breast feeding:  Yes              - Covid 19 vaccine: no              - influenza vaccine:               - Tdap       Marcelino Garrido MD

## 2023-09-06 ENCOUNTER — PRENATAL OFFICE VISIT (OUTPATIENT)
Dept: FAMILY MEDICINE | Facility: CLINIC | Age: 18
End: 2023-09-06
Payer: COMMERCIAL

## 2023-09-06 VITALS
DIASTOLIC BLOOD PRESSURE: 66 MMHG | SYSTOLIC BLOOD PRESSURE: 132 MMHG | TEMPERATURE: 98.2 F | OXYGEN SATURATION: 99 % | BODY MASS INDEX: 31.86 KG/M2 | WEIGHT: 191.25 LBS | HEIGHT: 65 IN | HEART RATE: 87 BPM | RESPIRATION RATE: 16 BRPM

## 2023-09-06 DIAGNOSIS — Z34.90 NORMAL PREGNANCY, ANTEPARTUM: Primary | ICD-10-CM

## 2023-09-06 PROCEDURE — 99207 PR PRENATAL VISIT: CPT | Performed by: FAMILY MEDICINE

## 2023-09-06 NOTE — PROGRESS NOTES
Concerns today:    No nausea/vomiting. No heartburn.  No vaginal bleeding, no contractions/severe cramping, no leakage of fluid.  No vaginal discharge. No dysuria  No headache, vision changes, lower extremity swelling, upper abdominal pain, chest pain, shortness of breath.     Pregnancy Issues              1. Childhood asthma              2. History of menorrhagia needing Fe transfusions                      Prenatal care plan              - prior deliveries: none  -OB Labs of concern: none. cfDNA NL              - Rh- pos              - First trimester screening:  Discussed completed              - Second trimester screening:  Discussed completed              - Labor pain management:  ?              - Ped:  RH              - Circumcision if boy: Yes              - BC: unsure              - Breast feeding:  Yes              - Covid 19 vaccine: no              - influenza vaccine:               - Tdap       Marcelino Garrido MD

## 2023-09-07 ENCOUNTER — HOSPITAL ENCOUNTER (OUTPATIENT)
Dept: ULTRASOUND IMAGING | Facility: CLINIC | Age: 18
Discharge: HOME | End: 2023-09-07
Attending: FAMILY MEDICINE | Admitting: FAMILY MEDICINE
Payer: COMMERCIAL

## 2023-09-07 DIAGNOSIS — Z34.90 NORMAL PREGNANCY, ANTEPARTUM: ICD-10-CM

## 2023-09-07 LAB — RADIOLOGIST FLAGS: NORMAL

## 2023-09-07 PROCEDURE — 76805 OB US >/= 14 WKS SNGL FETUS: CPT

## 2023-10-04 ENCOUNTER — PRENATAL OFFICE VISIT (OUTPATIENT)
Dept: FAMILY MEDICINE | Facility: CLINIC | Age: 18
End: 2023-10-04
Payer: COMMERCIAL

## 2023-10-04 VITALS
HEIGHT: 65 IN | OXYGEN SATURATION: 98 % | BODY MASS INDEX: 32.49 KG/M2 | TEMPERATURE: 98.3 F | RESPIRATION RATE: 14 BRPM | DIASTOLIC BLOOD PRESSURE: 60 MMHG | HEART RATE: 67 BPM | WEIGHT: 195 LBS | SYSTOLIC BLOOD PRESSURE: 108 MMHG

## 2023-10-04 DIAGNOSIS — Z34.90 NORMAL PREGNANCY, ANTEPARTUM: Primary | ICD-10-CM

## 2023-10-04 PROCEDURE — 99207 PR PRENATAL VISIT: CPT | Performed by: FAMILY MEDICINE

## 2023-10-04 ASSESSMENT — PAIN SCALES - GENERAL: PAINLEVEL: NO PAIN (0)

## 2023-10-04 NOTE — PROGRESS NOTES
Concerns:    Doing well.  No concerns today.  No vaginal bleeding, no contractions, no leakage of fluid  No nausea/vomiting. No heartburn  No vaginal discharge. No dysuria.   No headache, vision changes, lower extremity swelling, upper abdominal pain, chest pain, shortness of breath  Tdap planned next visit  Discussed PTL, PROM, and when to call or come in.  Normal anatomy ultrasound.  RTC 4 weeks.  GTT and labs next visit     Pregnancy Issues              1. Childhood asthma              2. History of menorrhagia needing Fe transfusions.   Hemoglobin   Date Value Ref Range Status   06/12/2023 12.9 11.7 - 15.7 g/dL Final   08/25/2020 12.2 11.7 - 15.7 g/dL Final   ]                       Prenatal care plan              - prior deliveries: none  -OB Labs of concern: none. cfDNA NL              - Rh- pos              - First trimester screening:  Discussed completed              - Second trimester screening:  Discussed completed              - Labor pain management:  ?              - Ped:  RH              - Circumcision if boy: Yes              - BC: unsure              - Breast feeding:  Yes              - Covid 19 vaccine: no              - influenza vaccine:               - Tdap   Marcelino Garrido MD

## 2023-10-09 ENCOUNTER — NURSE TRIAGE (OUTPATIENT)
Dept: FAMILY MEDICINE | Facility: CLINIC | Age: 18
End: 2023-10-09
Payer: COMMERCIAL

## 2023-10-09 ENCOUNTER — MYC MEDICAL ADVICE (OUTPATIENT)
Dept: FAMILY MEDICINE | Facility: CLINIC | Age: 18
End: 2023-10-09
Payer: COMMERCIAL

## 2023-10-09 ENCOUNTER — TELEPHONE (OUTPATIENT)
Dept: FAMILY MEDICINE | Facility: CLINIC | Age: 18
End: 2023-10-09
Payer: COMMERCIAL

## 2023-10-09 NOTE — TELEPHONE ENCOUNTER
Patient sent in [facility] regarding sleeping issues.  Patient has been having trouble sleeping for 1 1 /2 months.  This has gotten worse over the last week.    She is unable to get to sleep at all despite being exhauted and tired for 4 days. She states she has not sleep at all.  She feels like she is more out of breath with going up stairs.  No difficulty breathing at rest.  No fever today.  No pain.    She has tried to make it as dark as possible and she does not have screen time for 1 hour before bed.    She has not tried benadryl.    Per protocol patient should be seen within 3 days. Please advise if she can be worked in.    Pauly Walters RN on 10/9/2023 at 12:29 PM        Reason for Disposition   Insomnia persists > 1 week and following Insomnia Care Advice    Additional Information   Negative: Difficulty breathing   Negative: Depression is suspected   Negative: Traumatic Brain Injury (TBI) is suspected   Negative: Suspected alcohol withdrawal or unhealthy use of alcohol (drinking too much)   Negative: Suspected substance use (drug use), addiction, or withdrawal   Negative: Pain is causing insomnia and pain is not a chronic symptom (recurrent or ongoing AND present > 4 weeks)    Protocols used: Insomnia-A-[place]

## 2023-10-13 NOTE — TELEPHONE ENCOUNTER
"Left message for pt to return call, when call is returned give information below per Dr Garrido:    \"I'll see her in 2 wks as scheduled\"    Mimi Thomas CMA ([facility])      "

## 2023-10-15 ENCOUNTER — HOSPITAL ENCOUNTER (EMERGENCY)
Facility: CLINIC | Age: 18
End: 2023-10-15
Payer: COMMERCIAL

## 2023-10-15 ENCOUNTER — HOSPITAL ENCOUNTER (OUTPATIENT)
Facility: CLINIC | Age: 18
Discharge: HOME | End: 2023-10-15
Attending: FAMILY MEDICINE | Admitting: FAMILY MEDICINE
Payer: COMMERCIAL

## 2023-10-15 VITALS
SYSTOLIC BLOOD PRESSURE: 107 MMHG | DIASTOLIC BLOOD PRESSURE: 62 MMHG | HEART RATE: 81 BPM | RESPIRATION RATE: 18 BRPM | TEMPERATURE: 98 F

## 2023-10-15 DIAGNOSIS — O47.9 UTERINE CONTRACTIONS DURING PREGNANCY: Primary | ICD-10-CM

## 2023-10-15 LAB
ALBUMIN UR-MCNC: NEGATIVE MG/DL
AMORPH CRY #/AREA URNS HPF: ABNORMAL /HPF
APPEARANCE UR: ABNORMAL
BACTERIA #/AREA URNS HPF: ABNORMAL /HPF
BILIRUB UR QL STRIP: NEGATIVE
CLUE CELLS: ABNORMAL
COLOR UR AUTO: YELLOW
CRYSTALS AMN MICRO: NORMAL
GLUCOSE UR STRIP-MCNC: NEGATIVE MG/DL
HGB UR QL STRIP: NEGATIVE
KETONES UR STRIP-MCNC: NEGATIVE MG/DL
LEUKOCYTE ESTERASE UR QL STRIP: ABNORMAL
NITRATE UR QL: NEGATIVE
PH UR STRIP: 7 [PH] (ref 5–7)
RBC URINE: 1 /HPF
SP GR UR STRIP: 1.01 (ref 1–1.03)
SQUAMOUS EPITHELIAL: <1 /HPF
TRICHOMONAS, WET PREP: ABNORMAL
UROBILINOGEN UR STRIP-MCNC: NORMAL MG/DL
WBC URINE: 6 /HPF
WBC'S/HIGH POWER FIELD, WET PREP: ABNORMAL
YEAST, WET PREP: ABNORMAL

## 2023-10-15 PROCEDURE — 81001 URINALYSIS AUTO W/SCOPE: CPT | Performed by: FAMILY MEDICINE

## 2023-10-15 PROCEDURE — 87210 SMEAR WET MOUNT SALINE/INK: CPT | Performed by: FAMILY MEDICINE

## 2023-10-15 PROCEDURE — 250N000013 HC RX MED GY IP 250 OP 250 PS 637: Performed by: FAMILY MEDICINE

## 2023-10-15 PROCEDURE — G0463 HOSPITAL OUTPT CLINIC VISIT: HCPCS

## 2023-10-15 RX ORDER — METOCLOPRAMIDE HYDROCHLORIDE 5 MG/ML
10 INJECTION INTRAMUSCULAR; INTRAVENOUS EVERY 6 HOURS PRN
Status: DISCONTINUED | OUTPATIENT
Start: 2023-10-15 | End: 2023-10-15 | Stop reason: HOSPADM

## 2023-10-15 RX ORDER — ONDANSETRON 2 MG/ML
4 INJECTION INTRAMUSCULAR; INTRAVENOUS EVERY 6 HOURS PRN
Status: DISCONTINUED | OUTPATIENT
Start: 2023-10-15 | End: 2023-10-15 | Stop reason: HOSPADM

## 2023-10-15 RX ORDER — METOCLOPRAMIDE 5 MG/1
10 TABLET ORAL EVERY 6 HOURS PRN
Status: DISCONTINUED | OUTPATIENT
Start: 2023-10-15 | End: 2023-10-15 | Stop reason: HOSPADM

## 2023-10-15 RX ORDER — CEPHALEXIN 500 MG/1
500 CAPSULE ORAL 2 TIMES DAILY
Qty: 14 CAPSULE | Refills: 0 | Status: SHIPPED | OUTPATIENT
Start: 2023-10-15 | End: 2023-10-22

## 2023-10-15 RX ORDER — ONDANSETRON 4 MG/1
4 TABLET, ORALLY DISINTEGRATING ORAL EVERY 6 HOURS PRN
Status: DISCONTINUED | OUTPATIENT
Start: 2023-10-15 | End: 2023-10-15 | Stop reason: HOSPADM

## 2023-10-15 RX ORDER — PROCHLORPERAZINE MALEATE 5 MG
10 TABLET ORAL EVERY 6 HOURS PRN
Status: DISCONTINUED | OUTPATIENT
Start: 2023-10-15 | End: 2023-10-15 | Stop reason: HOSPADM

## 2023-10-15 RX ORDER — ACETAMINOPHEN 325 MG/1
650 TABLET ORAL EVERY 4 HOURS PRN
Status: DISCONTINUED | OUTPATIENT
Start: 2023-10-15 | End: 2023-10-15 | Stop reason: HOSPADM

## 2023-10-15 RX ORDER — PROCHLORPERAZINE 25 MG
25 SUPPOSITORY, RECTAL RECTAL EVERY 12 HOURS PRN
Status: DISCONTINUED | OUTPATIENT
Start: 2023-10-15 | End: 2023-10-15 | Stop reason: HOSPADM

## 2023-10-15 RX ADMIN — CEPHALEXIN 500 MG: 250 CAPSULE ORAL at 16:43

## 2023-10-15 RX ADMIN — ACETAMINOPHEN 650 MG: 325 TABLET, FILM COATED ORAL at 15:55

## 2023-10-15 ASSESSMENT — ACTIVITIES OF DAILY LIVING (ADL): ADLS_ACUITY_SCORE: 20

## 2023-10-15 NOTE — PROGRESS NOTES
S:  Discharge from triage.   A:  FHT's 140, Moderate variability, Accels present, no decels noted. Contractions none .  Cervical exam not done  R:  Written and verbal D/C instruction provided. Pt. Verbalized understanding of D/C instructions and will follow up with Dr Garrido as previously scheduled.   Verbalizes understanding that she will call or return to [facility] with any further questions or concerns.   Pt. D/C'd via ambulation with mother    Nursing Discharge Checklist  Discharge order entered: Yes  Patient care order entered: Yes  Charges entered: Yes  IV start and stop times have been documented in Epic? No  NST start and stop times have been documented in Epic Doc F/S? No

## 2023-10-15 NOTE — DISCHARGE INSTRUCTIONS
OB Triage Discharge Instructions    Diet:  Regular diet as tolerated    Activity:  As tolerated    Other Special Instructions: you can take Oral Melatonin 3 mg at night to help you sleep. Try to get the kind that dissolves under your tongue.     Reason for being seen in L&D: watery discharge, uterine cramping    Treatment/procedures performed in L&D: Labs, monitor    Call the [facility] at [phone] if you have:  5 or more contractions in one hour  Leaking of fluid from your vaginal area  Decreased fetal movement (follow kick count instructions)  Bleeding from your vaginal area  Swelling in your face, or increased swelling in your hands or legs  Headaches or vision problems such as blurring or seeing spots or starts  Nausea or vomiting lasting for more than 24 hours  Epigastric pain (sometimes confused with heartburn that does not go away or a very bad stomach ache)    If you have any questions, please follow up with your doctor.        Patient Signature: ______________________________________________________________  By signing the above I acknowledge that a nurse or my care provider has explained the instruction to me and I have had any questions regarding my care explained to me.        Discharge Nurse Signature: _______________________________ 10/15/2023  4:33 PM    Method of discharge: ambulation    Accompanied by: mother  Time of discharge: 4:45

## 2023-10-15 NOTE — PROGRESS NOTES
S: Triage Arrival  B: Ely is a 18 y.o.  @ 26w 0d who presents with complaint of vaginal wetness and previous lower abdominal cramping  A: EFM applied. FHT's 140 with moderate variability, accels present, no decels noted on strip. Contractions none noted or palpated. Ua, wet prep and ferning sent to lab. Po Tylenol given for mild headache  R: Will notify MD to obtain further orders.

## 2023-10-31 ENCOUNTER — PRENATAL OFFICE VISIT (OUTPATIENT)
Dept: FAMILY MEDICINE | Facility: CLINIC | Age: 18
End: 2023-10-31
Payer: COMMERCIAL

## 2023-10-31 VITALS
TEMPERATURE: 98.4 F | SYSTOLIC BLOOD PRESSURE: 116 MMHG | OXYGEN SATURATION: 99 % | HEIGHT: 65 IN | WEIGHT: 199 LBS | HEART RATE: 63 BPM | BODY MASS INDEX: 33.15 KG/M2 | RESPIRATION RATE: 10 BRPM | DIASTOLIC BLOOD PRESSURE: 75 MMHG

## 2023-10-31 DIAGNOSIS — Z34.90 NORMAL PREGNANCY, ANTEPARTUM: Primary | ICD-10-CM

## 2023-10-31 LAB
GLUCOSE 1H P 50 G GLC PO SERPL-MCNC: 128 MG/DL (ref 70–129)
HGB BLD-MCNC: 11.8 G/DL (ref 11.7–15.7)

## 2023-10-31 PROCEDURE — 36415 COLL VENOUS BLD VENIPUNCTURE: CPT | Performed by: FAMILY MEDICINE

## 2023-10-31 PROCEDURE — 86780 TREPONEMA PALLIDUM: CPT | Performed by: FAMILY MEDICINE

## 2023-10-31 PROCEDURE — 99207 PR PRENATAL VISIT: CPT | Performed by: FAMILY MEDICINE

## 2023-10-31 PROCEDURE — 90472 IMMUNIZATION ADMIN EACH ADD: CPT | Performed by: FAMILY MEDICINE

## 2023-10-31 PROCEDURE — 90682 RIV4 VACC RECOMBINANT DNA IM: CPT | Performed by: FAMILY MEDICINE

## 2023-10-31 PROCEDURE — 90715 TDAP VACCINE 7 YRS/> IM: CPT | Performed by: FAMILY MEDICINE

## 2023-10-31 PROCEDURE — 90471 IMMUNIZATION ADMIN: CPT | Performed by: FAMILY MEDICINE

## 2023-10-31 PROCEDURE — 82950 GLUCOSE TEST: CPT | Performed by: FAMILY MEDICINE

## 2023-10-31 NOTE — PROGRESS NOTES
Concerns:   Doing well.  No concerns today.  No vaginal bleeding, loss of fluid, or contractions  Tdap given today  Discussed kick counts and fetal movement.  Discussed PTL, PROM, and when to call or come in.  RTC in 2 weeks.    Pregnancy Issues              1. Childhood asthma              2. History of menorrhagia needing Fe transfusions.    3. Constipation. On miralax and senna            Prenatal care plan              - prior deliveries: none  -OB Labs of concern: none. cfDNA NL              - Rh- pos              - First trimester screening:  Discussed completed              - Second trimester screening:  Discussed completed              - Labor pain management:  ?              - Ped:  RH              - Circumcision if boy: Yes              - BC: unsure              - Breast feeding:  Yes              - Covid 19 vaccine: no              - influenza vaccine: done              - Tdap done  Marcelino Garrido MD

## 2023-11-01 LAB — T PALLIDUM AB SER QL: NONREACTIVE

## 2023-11-14 ENCOUNTER — PRENATAL OFFICE VISIT (OUTPATIENT)
Dept: FAMILY MEDICINE | Facility: CLINIC | Age: 18
End: 2023-11-14
Payer: COMMERCIAL

## 2023-11-14 ENCOUNTER — HOSPITAL ENCOUNTER (OUTPATIENT)
Facility: CLINIC | Age: 18
Discharge: HOME | End: 2023-11-14
Attending: FAMILY MEDICINE | Admitting: FAMILY MEDICINE
Payer: COMMERCIAL

## 2023-11-14 VITALS
TEMPERATURE: 97.5 F | SYSTOLIC BLOOD PRESSURE: 118 MMHG | DIASTOLIC BLOOD PRESSURE: 70 MMHG | HEART RATE: 94 BPM | OXYGEN SATURATION: 97 % | WEIGHT: 198 LBS | HEIGHT: 65 IN | RESPIRATION RATE: 16 BRPM | BODY MASS INDEX: 32.99 KG/M2

## 2023-11-14 VITALS
DIASTOLIC BLOOD PRESSURE: 74 MMHG | TEMPERATURE: 97.4 F | RESPIRATION RATE: 16 BRPM | SYSTOLIC BLOOD PRESSURE: 126 MMHG | HEART RATE: 116 BPM

## 2023-11-14 DIAGNOSIS — O23.43 URINARY TRACT INFECTION IN MOTHER DURING THIRD TRIMESTER OF PREGNANCY: Primary | ICD-10-CM

## 2023-11-14 DIAGNOSIS — Z34.90 NORMAL PREGNANCY, ANTEPARTUM: Primary | ICD-10-CM

## 2023-11-14 PROBLEM — Z34.03 SUPERVISION OF NORMAL FIRST PREGNANCY IN THIRD TRIMESTER: Status: ACTIVE | Noted: 2023-11-14

## 2023-11-14 PROBLEM — O60.00 PRETERM LABOR: Status: ACTIVE | Noted: 2023-11-14

## 2023-11-14 PROBLEM — O99.891 BACK PAIN AFFECTING PREGNANCY IN THIRD TRIMESTER: Status: ACTIVE | Noted: 2023-11-14

## 2023-11-14 PROBLEM — M54.9 BACK PAIN AFFECTING PREGNANCY IN THIRD TRIMESTER: Status: ACTIVE | Noted: 2023-11-14

## 2023-11-14 LAB
ALBUMIN UR-MCNC: 100 MG/DL
APPEARANCE UR: ABNORMAL
BACTERIA #/AREA URNS HPF: ABNORMAL /HPF
BILIRUB UR QL STRIP: NEGATIVE
CLUE CELLS: ABNORMAL
COLOR UR AUTO: YELLOW
CRYSTALS AMN MICRO: NORMAL
FFN SPECIMEN INTEGRITY: NORMAL
FIBRONECTIN FETAL VAG QL: NEGATIVE
GLUCOSE UR STRIP-MCNC: NEGATIVE MG/DL
HGB UR QL STRIP: ABNORMAL
KETONES UR STRIP-MCNC: 5 MG/DL
LEUKOCYTE ESTERASE UR QL STRIP: ABNORMAL
MUCOUS THREADS #/AREA URNS LPF: PRESENT /LPF
NITRATE UR QL: NEGATIVE
PH UR STRIP: 7 [PH] (ref 5–7)
RBC URINE: 75 /HPF
RUPTURE OF FETAL MEMBRANES BY ROM PLUS: NEGATIVE
SP GR UR STRIP: 1.02 (ref 1–1.03)
SQUAMOUS EPITHELIAL: 2 /HPF
TRICHOMONAS, WET PREP: ABNORMAL
UROBILINOGEN UR STRIP-MCNC: 2 MG/DL
WBC URINE: >182 /HPF
WBC'S/HIGH POWER FIELD, WET PREP: ABNORMAL
YEAST, WET PREP: ABNORMAL

## 2023-11-14 PROCEDURE — 87653 STREP B DNA AMP PROBE: CPT | Performed by: FAMILY MEDICINE

## 2023-11-14 PROCEDURE — G0463 HOSPITAL OUTPT CLINIC VISIT: HCPCS

## 2023-11-14 PROCEDURE — 87210 SMEAR WET MOUNT SALINE/INK: CPT | Performed by: FAMILY MEDICINE

## 2023-11-14 PROCEDURE — 99207 PR PRENATAL VISIT: CPT | Performed by: FAMILY MEDICINE

## 2023-11-14 PROCEDURE — 81001 URINALYSIS AUTO W/SCOPE: CPT | Performed by: FAMILY MEDICINE

## 2023-11-14 PROCEDURE — 84112 EVAL AMNIOTIC FLUID PROTEIN: CPT | Performed by: FAMILY MEDICINE

## 2023-11-14 PROCEDURE — 87086 URINE CULTURE/COLONY COUNT: CPT | Performed by: FAMILY MEDICINE

## 2023-11-14 PROCEDURE — 82731 ASSAY OF FETAL FIBRONECTIN: CPT | Performed by: FAMILY MEDICINE

## 2023-11-14 PROCEDURE — 99214 OFFICE O/P EST MOD 30 MIN: CPT | Performed by: FAMILY MEDICINE

## 2023-11-14 RX ORDER — METOCLOPRAMIDE HYDROCHLORIDE 5 MG/ML
10 INJECTION INTRAMUSCULAR; INTRAVENOUS EVERY 6 HOURS PRN
Status: DISCONTINUED | OUTPATIENT
Start: 2023-11-14 | End: 2023-11-14 | Stop reason: HOSPADM

## 2023-11-14 RX ORDER — PROCHLORPERAZINE 25 MG
25 SUPPOSITORY, RECTAL RECTAL EVERY 12 HOURS PRN
Status: DISCONTINUED | OUTPATIENT
Start: 2023-11-14 | End: 2023-11-14 | Stop reason: HOSPADM

## 2023-11-14 RX ORDER — ONDANSETRON 2 MG/ML
4 INJECTION INTRAMUSCULAR; INTRAVENOUS EVERY 6 HOURS PRN
Status: DISCONTINUED | OUTPATIENT
Start: 2023-11-14 | End: 2023-11-14 | Stop reason: HOSPADM

## 2023-11-14 RX ORDER — ONDANSETRON 4 MG/1
4 TABLET, ORALLY DISINTEGRATING ORAL EVERY 6 HOURS PRN
Status: DISCONTINUED | OUTPATIENT
Start: 2023-11-14 | End: 2023-11-14 | Stop reason: HOSPADM

## 2023-11-14 RX ORDER — NITROFURANTOIN 25; 75 MG/1; MG/1
100 CAPSULE ORAL 2 TIMES DAILY
Qty: 14 CAPSULE | Refills: 0 | Status: SHIPPED | OUTPATIENT
Start: 2023-11-14 | End: 2023-11-21

## 2023-11-14 RX ORDER — METOCLOPRAMIDE 5 MG/1
10 TABLET ORAL EVERY 6 HOURS PRN
Status: DISCONTINUED | OUTPATIENT
Start: 2023-11-14 | End: 2023-11-14 | Stop reason: HOSPADM

## 2023-11-14 RX ORDER — PROCHLORPERAZINE MALEATE 5 MG
10 TABLET ORAL EVERY 6 HOURS PRN
Status: DISCONTINUED | OUTPATIENT
Start: 2023-11-14 | End: 2023-11-14 | Stop reason: HOSPADM

## 2023-11-14 ASSESSMENT — ACTIVITIES OF DAILY LIVING (ADL)
ADLS_ACUITY_SCORE: 20
ADLS_ACUITY_SCORE: 20

## 2023-11-14 ASSESSMENT — PAIN SCALES - GENERAL: PAINLEVEL: MODERATE PAIN (5)

## 2023-11-14 NOTE — LETTER
November 14, 2023      Ely Pritchard  [id number] [facility] [id number]        To Whom It May Concern,     This is to confirm that Ely is pregnant and will not be attending next semester.     Estimated Date of Delivery: Jan 21, 2024           Sincerely,        Marcelino Garrido MD

## 2023-11-14 NOTE — PROGRESS NOTES
S: Triage Arrival  B: Ely is a 18 y.o.  @ 30w 2d who presents from clinic after seeing primary MD with c/o backache and leaking for the last 5 days & pain has been increasing in intensity   A: EFM applied. FHT's130's with moderate variability, accels present, no decels noted on strip. Contractions not felt per pt's reports. Dr. Abrahamson called with orders  R: Will assess pt & collect labs as ordered.

## 2023-11-14 NOTE — PROGRESS NOTES
Dr. Boone was here to assess pt, cervix was assessed & noted to be closed. MD went over plan of care with pt & mother. Pt was noted to have a UTI, a culture was sent & pt with be d/ofe to home with antibiotics.

## 2023-11-14 NOTE — PROGRESS NOTES
UA was sent, pt denies pain, irritability was noted on monitor. Marian Ashby RN was given report & will take over care at this time.

## 2023-11-14 NOTE — PROGRESS NOTES
"  2023    Ely Pritchard  [id number]        OB Triage assessment      Ms. Pritchard  is here with concern for crampy back pain and vaginal discharge, concerned about PTL.  Her back has been sore through the pregnancy but the past few days are worse, low across the middle. Also she was recently seen and treated for a possible UTI, took Keflex but it didn't seem to help.   4 days ago she had scant vaginal bleeding with urination and a gush of clear fluid. That has not recurred. The low back pain comes and goes, feels like pressure, is positional. Baby still active.     Patient's last menstrual period was 2023 (exact date).   Her Estimated Date of Delivery: 2024, making her 30w2d  wks.      Estimated body mass index is 32.95 kg/m  as calculated from the following:    Height as of an earlier encounter on 23: 1.651 m (5' 5\").    Weight as of an earlier encounter on 23: 89.8 kg (198 lb).  Her prenatal course has been otherwise uncomplicated.    See prenatal for labs.   Rubella Immune, RH positive    She is a 18 year old   Her OB history:   OB History    Para Term  AB Living   1 0 0 0 0 0   SAB IAB Ectopic Multiple Live Births   0 0 0 0 0      # Outcome Date GA Lbr Galileo/2nd Weight Sex Delivery Anes PTL Lv   1 Current               Obstetric Comments   EDC  based on LMP, SO [place], no exposure to tobacco, cats- education, no safety concerns, works outside of the home-[employer].        Past Medical History:   Diagnosis Date    Anemia     Depressive disorder 2018    Mild persistent asthma     resolved    Pneumonia due to Mycoplasma pneumoniae 2007    hospitalized    Primary nocturnal enuresis     Unspecified sinusitis (chronic)           Past Surgical History:   Procedure Laterality Date    PE TUBES  12/29/2006    x  2     TONSILLECTOMY & ADENOIDECTOMY  2006    ZZHC CREATE EARDRUM OPENING,GEN ANESTH  09         No current outpatient medications on " file.       Allergies: Mold and Seasonal allergies      REVIEW OF SYSTEMS:  NEUROLOGIC:  Negative  EYES:  Negative  ENT:  Negative  GI:  Negative  BREAST:  Negative  :  just the back pain and some vaginal wetness.   OB: no vaginal bleeding. Baby active.   GYN:  Negative  CV:  Negative  PULMONARY:  Negative  MUSCULOSKELETAL:  positive for back pain as above.   PSYCH:  Negative        Social History     Socioeconomic History    Marital status: Single     Spouse name: Not on file    Number of children: Not on file    Years of education: Not on file    Highest education level: Not on file   Occupational History    Not on file   Tobacco Use    Smoking status: Never     Passive exposure: Never    Smokeless tobacco: Never    Tobacco comments:     No smokers in home   Vaping Use    Vaping Use: Never used   Substance and Sexual Activity    Alcohol use: No    Drug use: No    Sexual activity: Yes     Partners: Male     Birth control/protection: Pull-out method, Condom   Other Topics Concern    Parent/sibling w/ CABG, MI or angioplasty before 65F 55M? No   Social History Narrative    Not on file     Social Determinants of Health     Financial Resource Strain: Low Risk  (10/3/2023)    Financial Resource Strain     Within the past 12 months, have you or your family members you live with been unable to get utilities (heat, electricity) when it was really needed?: No   Food Insecurity: Low Risk  (10/3/2023)    Food Insecurity     Within the past 12 months, did you worry that your food would run out before you got money to buy more?: No     Within the past 12 months, did the food you bought just not last and you didn t have money to get more?: No   Transportation Needs: Low Risk  (10/3/2023)    Transportation Needs     Within the past 12 months, has lack of transportation kept you from medical appointments, getting your medicines, non-medical meetings or appointments, work, or from getting things that you need?: No   Physical  Activity: Not on file   Stress: Not on file   Social Connections: Not on file   Interpersonal Safety: Not on file   Housing Stability: Low Risk  (10/3/2023)    Housing Stability     Do you have housing? : Yes     Are you worried about losing your housing?: No      Family History   Problem Relation Age of Onset    Depression Mother     Anxiety Disorder Mother     Hypertension Father     Respiratory Maternal Grandmother         asthma    Diabetes Maternal Grandmother     Diabetes Maternal Grandfather     No Known Problems Paternal Grandmother     No Known Problems Paternal Grandfather     Depression Brother     Anxiety Disorder Brother     Depression Brother     Anxiety Disorder Brother        Vitals:   FHT 140s with good variability, accels present ,no decels, no regular contractions    Alert Awake in NAD  HEENT grossly normal  Neck: no lymphadenopathy or thryoidomegaly  Lungs clear to auscultation  Back no spinal or CVAT, just tender across lower sacrum/SI joint area  Heart RRR without murmur  ABD gravid, nontender on exam  Pelvic:  no fluid noted, no blood noted  Cervix is closed and posterior and long, -4  EXT:  no edema or calf tenderness  Neuro:  intact    Results for orders placed or performed during the hospital encounter of 11/14/23   Fetal fibronectin     Status: None   Result Value Ref Range    Fetal Fibronectin Negative Negative    FFN Specimen Integrity Satisfactory Specimen    Fern Test for Rupture of Membranes     Status: Normal   Result Value Ref Range    Fern Crystallization No ferning present No ferning present   Rupture of Fetal Membranes by ROM Plus     Status: Normal   Result Value Ref Range    Rupture of Fetal Membranes by ROM Plus Negative Negative, Invalid, Suggest Repeat    Narrative    It is recommended that the tests to detect rupture of the amniotic membranes should not be used without other clinical assessments to make clinical patient management decision.   UA with Microscopic reflex to  Culture     Status: Abnormal    Specimen: Urine, Midstream   Result Value Ref Range    Color Urine Yellow Colorless, Straw, Light Yellow, Yellow    Appearance Urine Cloudy (A) Clear    Glucose Urine Negative Negative mg/dL    Bilirubin Urine Negative Negative    Ketones Urine 5 (A) Negative mg/dL    Specific Gravity Urine 1.016 1.003 - 1.035    Blood Urine Small (A) Negative    pH Urine 7.0 5.0 - 7.0    Protein Albumin Urine 100 (A) Negative mg/dL    Urobilinogen Urine 2.0 Normal, 2.0 mg/dL    Nitrite Urine Negative Negative    Leukocyte Esterase Urine Large (A) Negative    Bacteria Urine Moderate (A) None Seen /HPF    Mucus Urine Present (A) None Seen /LPF    RBC Urine 75 (H) <=2 /HPF    WBC Urine >182 (H) <=5 /HPF    Squamous Epithelials Urine 2 (H) <=1 /HPF    Narrative    Urine Culture ordered based on laboratory criteria   Wet prep     Status: Abnormal    Specimen: Vagina; Swab   Result Value Ref Range    Trichomonas Absent Absent    Yeast Absent Absent    Clue Cells Absent Absent    WBCs/high power field 1+ (A) None      Assessment:  IUP at 30w2d  with low back pain and earlier spotting/vaginal discharge.   Probable UTI, discussed possible contaminant, possible back pain of pregnancy just from physiologic changes.     Plan:  will treat her wit macrobid for 7 days. Await urine culture and update plan if culture negative.   Advised on PTL symptoms, return if needed.   With normal wet prep, negative FFN and Ferning and ROM+, especially with long closed posterior cervix, concern for PTL is very low at this time. Reassured. Discussed comfort cares for back pain, ice, heat, stretches, may use massage, chiropractic. If no help, consider PT referral. Continue routine prenatal care.   GBS sent in case of PTL but will need to be repeated at 36 weeks.       Yanet Boone MD  Dept of Family Medicine  November 14, 2023

## 2023-11-14 NOTE — DISCHARGE INSTRUCTIONS
OB Triage Discharge Instructions    Diet:  Regular diet as tolerated  Drink 8-10 glasses of water and / or juice every day    Activity:  As tolerated    Other Special Instructions: Follow up as previously scheduled or sooner with worsening symptoms    Reason for being seen in L&D: backache, leaking of fluid    Treatment/procedures performed in L&D: UA, urine culture, GBS culture, wet prep, FFN, ferning & ROM plus    Call the [facility] at [phone] if you have:  5 or more contractions in one hour  Leaking of fluid from your vaginal area  Decreased fetal movement (follow kick count instructions)  Bleeding from your vaginal area  Swelling in your face, or increased swelling in your hands or legs  Headaches or vision problems such as blurring or seeing spots or starts  Nausea or vomiting lasting for more than 24 hours  An increase in weight (5lbs/week)  Epigastric pain (sometimes confused with heartburn that does not go away or a very bad stomach ache)    If you have any questions, please follow up with your doctor.        Patient Signature: ______________________________________________________________  By signing the above I acknowledge that a nurse or my care provider has explained the instruction to me and I have had any questions regarding my care explained to me.        Discharge Nurse Signature: _______________________________ 11/14/2023  10:45 AM    Method of discharge: amb    Accompanied by: mother  Time of discharge: 1050

## 2023-11-14 NOTE — PROGRESS NOTES
Concerns:    4 days ago she had scant vaginal bleeding in the toilet when she urinated, and also a gush of clear fluid.  Unsure if it was urine.  That has not reoccurred.  Last 2 or 3 days she has developed low back pain and pelvic pressure that seems to come and go.  The back pain is positional.  I am sending her to labor and delivery for  rule out.      Discussed kick counts and fetal movement.  Discussed PTL, PROM, and when to call or come in.  RTC 2 weeks.      Pregnancy Issues              1. Childhood asthma              2. History of menorrhagia needing Fe transfusions.               3. Constipation. On miralax and senna            Prenatal care plan              - prior deliveries: none  -OB Labs of concern: none. cfDNA NL              - Rh- pos              - First trimester screening:  Discussed completed              - Second trimester screening:  Discussed completed              - Labor pain management:  ?              - Ped:  RH              - Circumcision if boy: Yes              - BC: unsure              - Breast feeding:  Yes              - Covid 19 vaccine: no              - influenza vaccine: done              - Tdap done  Marcelino Garrido MD

## 2023-11-15 LAB
BACTERIA UR CULT: NORMAL
GP B STREP DNA SPEC QL NAA+PROBE: NEGATIVE

## 2023-11-17 NOTE — RESULT ENCOUNTER NOTE
Ely, the strep test is negative, which is great.  I will still want you to be re-tested at 36 weeks which is the usual timing for this test in pregnancy.  We only did this early for you in case you going into  labor.  Also your urine culture shows that you do NOT have a bladder infection.  Please stop the antibiotics as you do not need them.  Yanet Boone MD

## 2023-11-28 ENCOUNTER — PRENATAL OFFICE VISIT (OUTPATIENT)
Dept: FAMILY MEDICINE | Facility: CLINIC | Age: 18
End: 2023-11-28
Payer: COMMERCIAL

## 2023-11-28 VITALS
TEMPERATURE: 97 F | BODY MASS INDEX: 34.39 KG/M2 | HEIGHT: 65 IN | SYSTOLIC BLOOD PRESSURE: 110 MMHG | HEART RATE: 98 BPM | RESPIRATION RATE: 20 BRPM | OXYGEN SATURATION: 99 % | DIASTOLIC BLOOD PRESSURE: 64 MMHG | WEIGHT: 206.4 LBS

## 2023-11-28 DIAGNOSIS — Z34.90 NORMAL PREGNANCY, ANTEPARTUM: Primary | ICD-10-CM

## 2023-11-28 PROCEDURE — 99207 PR PRENATAL VISIT: CPT | Performed by: FAMILY MEDICINE

## 2023-11-28 ASSESSMENT — PAIN SCALES - GENERAL: PAINLEVEL: NO PAIN (0)

## 2023-11-28 NOTE — PROGRESS NOTES
Concerns:    Doing well.  No concerns today.  No vaginal bleeding, LOF.  No contractions.  Discussed kick counts and fetal movement.  Discussed PTL, PROM, and when to call or come in.  RTC 2 weeks.    RSV next visit      Pregnancy Issues              1. Childhood asthma              2. History of menorrhagia needing Fe transfusions.               3. Constipation. On miralax and senna            Prenatal care plan              - prior deliveries: none  -OB Labs of concern: none. cfDNA NL              - Rh- pos              - First trimester screening:  Discussed completed              - Second trimester screening:  Discussed completed              - Labor pain management:  ?              - Ped:  RH              - Circumcision if boy: Yes              - BC: unsure              - Breast feeding:  Yes              - Covid 19 vaccine: no              - influenza vaccine: done              - Tdap done  RSV__      Marcelino Garrido MD

## 2023-12-07 ENCOUNTER — MYC MEDICAL ADVICE (OUTPATIENT)
Dept: FAMILY MEDICINE | Facility: CLINIC | Age: 18
End: 2023-12-07
Payer: COMMERCIAL

## 2023-12-12 ENCOUNTER — HOSPITAL ENCOUNTER (OUTPATIENT)
Facility: CLINIC | Age: 18
Discharge: HOME | End: 2023-12-12
Attending: FAMILY MEDICINE | Admitting: FAMILY MEDICINE
Payer: COMMERCIAL

## 2023-12-12 VITALS
RESPIRATION RATE: 16 BRPM | TEMPERATURE: 98.4 F | SYSTOLIC BLOOD PRESSURE: 111 MMHG | HEART RATE: 111 BPM | DIASTOLIC BLOOD PRESSURE: 73 MMHG

## 2023-12-12 PROBLEM — Z36.89 ENCOUNTER FOR TRIAGE IN PREGNANT PATIENT: Status: ACTIVE | Noted: 2023-12-12

## 2023-12-12 LAB
ALBUMIN UR-MCNC: ABNORMAL MG/DL
APPEARANCE UR: ABNORMAL
BACTERIA #/AREA URNS HPF: ABNORMAL /HPF
BILIRUB UR QL STRIP: NEGATIVE
COLOR UR AUTO: YELLOW
CRYSTALS AMN MICRO: NORMAL
GLUCOSE UR STRIP-MCNC: NEGATIVE MG/DL
HGB UR QL STRIP: NEGATIVE
KETONES UR STRIP-MCNC: NEGATIVE MG/DL
LEUKOCYTE ESTERASE UR QL STRIP: ABNORMAL
MUCOUS THREADS #/AREA URNS LPF: PRESENT /LPF
NITRATE UR QL: NEGATIVE
PH UR STRIP: 7 [PH] (ref 5–7)
RBC URINE: <1 /HPF
SP GR UR STRIP: 1.02 (ref 1–1.03)
SQUAMOUS EPITHELIAL: 12 /HPF
UROBILINOGEN UR STRIP-MCNC: NORMAL MG/DL
WBC URINE: 6 /HPF

## 2023-12-12 PROCEDURE — 59025 FETAL NON-STRESS TEST: CPT

## 2023-12-12 PROCEDURE — 81001 URINALYSIS AUTO W/SCOPE: CPT | Performed by: FAMILY MEDICINE

## 2023-12-12 PROCEDURE — G0463 HOSPITAL OUTPT CLINIC VISIT: HCPCS | Mod: 25

## 2023-12-12 RX ORDER — LIDOCAINE 40 MG/G
CREAM TOPICAL
Status: DISCONTINUED | OUTPATIENT
Start: 2023-12-12 | End: 2023-12-12 | Stop reason: HOSPADM

## 2023-12-12 ASSESSMENT — ACTIVITIES OF DAILY LIVING (ADL): ADLS_ACUITY_SCORE: 20

## 2023-12-12 NOTE — PROGRESS NOTES
S: Triage Arrival  B: Ely is a 18 y.o.  @ 34w 6d who presents with complaint of decreased fetal movement since this am & also experiencing cramping since yesterday. Denies bleeding.  A: EFM applied. FHT's 145 with moderate variability, accels present, no decels noted on strip. Contractions not felt per pt & not felt per palpitation. Fetal movements are heard on monitor & pt feeling also. UA obtained r/t to the cramping & brie obtained r/t to increase discharge.  R: Will notify MD to obtain further orders after lab results are back.

## 2023-12-12 NOTE — PROGRESS NOTES
Ferning was negative & UA results reviewed by Dr. Boone. Cervix was checked & was noted to be closed per MD. Pt will be discharge to home.

## 2023-12-12 NOTE — DISCHARGE INSTRUCTIONS
OB Triage Discharge Instructions    Diet:  Regular diet as tolerated  Drink 8-10 glasses of water and / or juice every day    Activity:  As tolerated    Other Special Instructions: Follow up tomorrow for regular scheduled visit    Reason for being seen in L&D: decreased fetal movement & abdominal cramping    Treatment/procedures performed in L&D: UA, ferning, cervical exam & fetal monitoring    Call the [facility] at [phone] if you have:  5 or more contractions in one hour  Leaking of fluid from your vaginal area  Decreased fetal movement (follow kick count instructions)  Bleeding from your vaginal area  Swelling in your face, or increased swelling in your hands or legs  Headaches or vision problems such as blurring or seeing spots or starts  Nausea or vomiting lasting for more than 24 hours  An increase in weight (5lbs/week)  Epigastric pain (sometimes confused with heartburn that does not go away or a very bad stomach ache)    If you have any questions, please follow up with your doctor.        Patient Signature: ______________________________________________________________  By signing the above I acknowledge that a nurse or my care provider has explained the instruction to me and I have had any questions regarding my care explained to me.        Discharge Nurse Signature: _______________________________ 12/12/2023  9:50 AM    Method of discharge: amb    Accompanied by: mother  Time of discharge: 0955

## 2023-12-12 NOTE — PROGRESS NOTES
S:  Discharge from triage.   A:  FHT's 140, Moderate variability, Accels present, no decels noted. Contractions not seen on monitor or felt per pt.  Cervical exam closed  R:  Written and verbal D/C instruction provided. Pt. Verbalized understanding of D/C instructions and will follow up with primary MD as previously scheduled.   Verbalizes understanding that she will call or return to the [facility] with any further questions or concerns.   Pt. D/C'd via amb with mother    Nursing Discharge Checklist  Discharge order entered: Yes  Patient care order entered: Yes  Charges entered: Yes  IV start and stop times have been documented in Epic?  N/a  NST start and stop times have been documented in Epic Doc F/S? Yes

## 2023-12-13 ENCOUNTER — PRENATAL OFFICE VISIT (OUTPATIENT)
Dept: FAMILY MEDICINE | Facility: CLINIC | Age: 18
End: 2023-12-13
Payer: COMMERCIAL

## 2023-12-13 VITALS
SYSTOLIC BLOOD PRESSURE: 120 MMHG | WEIGHT: 208.5 LBS | DIASTOLIC BLOOD PRESSURE: 74 MMHG | TEMPERATURE: 99.2 F | HEART RATE: 71 BPM | OXYGEN SATURATION: 98 % | RESPIRATION RATE: 18 BRPM | BODY MASS INDEX: 34.7 KG/M2

## 2023-12-13 DIAGNOSIS — Z34.90 NORMAL PREGNANCY, ANTEPARTUM: Primary | ICD-10-CM

## 2023-12-13 PROCEDURE — 99207 PR PRENATAL VISIT: CPT | Performed by: FAMILY MEDICINE

## 2023-12-13 ASSESSMENT — PAIN SCALES - GENERAL: PAINLEVEL: NO PAIN (0)

## 2023-12-13 NOTE — PROGRESS NOTES
Concerns:   Doing well.  No concerns today.  No vaginal bleeding, LOF.  No contractions.  Discussed kick counts and fetal movement.  Discussed PTL, PROM, and when to call or come in.  RTC 2 weeks.      Pregnancy Issues              1. Childhood asthma              2. History of menorrhagia needing Fe transfusions.               3. Constipation. On miralax and senna            Prenatal care plan              - prior deliveries: none  -OB Labs of concern: none. cfDNA NL              - Rh- pos              - First trimester screening:  Discussed completed              - Second trimester screening:  Discussed completed              - Labor pain management:  ?              - Ped:  RH              - Circumcision if boy: Yes              - BC: unsure              - Breast feeding:  Yes              - Covid 19 vaccine: no              - influenza vaccine: done              - Tdap done  RSV__     Marcelino Garrido MD

## 2023-12-27 ENCOUNTER — PRENATAL OFFICE VISIT (OUTPATIENT)
Dept: FAMILY MEDICINE | Facility: CLINIC | Age: 18
End: 2023-12-27
Payer: COMMERCIAL

## 2023-12-27 VITALS
SYSTOLIC BLOOD PRESSURE: 110 MMHG | HEIGHT: 65 IN | BODY MASS INDEX: 35.68 KG/M2 | DIASTOLIC BLOOD PRESSURE: 70 MMHG | RESPIRATION RATE: 18 BRPM | OXYGEN SATURATION: 99 % | HEART RATE: 116 BPM | TEMPERATURE: 97.6 F | WEIGHT: 214.13 LBS

## 2023-12-27 DIAGNOSIS — Z34.03 SUPERVISION OF NORMAL FIRST PREGNANCY IN THIRD TRIMESTER: Primary | ICD-10-CM

## 2023-12-27 PROCEDURE — 99207 PR PRENATAL VISIT: CPT | Performed by: FAMILY MEDICINE

## 2023-12-27 NOTE — PROGRESS NOTES
Concerns:    Doing well.  No concerns today.  No vaginal bleeding, LOF, contractions.  No HA, RUQ pain, N/V, visual changes.  Labor precautions discussed.  RTC 1 week.    Gbs neg  B-h mild  Felt baby drop    Pregnancy Issues              1. Childhood asthma              2. History of menorrhagia needing Fe transfusions.               3. Constipation. On miralax and senna            Prenatal care plan              - prior deliveries: none  -OB Labs of concern: none. cfDNA NL              - Rh- pos              - First trimester screening:  Discussed completed              - Second trimester screening:  Discussed completed              - Labor pain management:  ?              - Ped:  RH              - Circumcision if boy: Yes              - BC: unsure              - Breast feeding:  Yes              - Covid 19 vaccine: no              - influenza vaccine: done              - Tdap done   - RSV declined    Marcelino Garrido MD

## 2023-12-29 ENCOUNTER — HOSPITAL ENCOUNTER (OUTPATIENT)
Facility: CLINIC | Age: 18
Discharge: HOME | End: 2023-12-30
Attending: FAMILY MEDICINE | Admitting: FAMILY MEDICINE
Payer: COMMERCIAL

## 2023-12-29 PROCEDURE — G0463 HOSPITAL OUTPT CLINIC VISIT: HCPCS | Mod: 25

## 2023-12-29 RX ORDER — LIDOCAINE 40 MG/G
CREAM TOPICAL
Status: DISCONTINUED | OUTPATIENT
Start: 2023-12-29 | End: 2023-12-30 | Stop reason: HOSPADM

## 2023-12-30 ENCOUNTER — HOSPITAL ENCOUNTER (OUTPATIENT)
Facility: CLINIC | Age: 18
End: 2023-12-30
Admitting: FAMILY MEDICINE
Payer: COMMERCIAL

## 2023-12-30 VITALS
DIASTOLIC BLOOD PRESSURE: 53 MMHG | HEART RATE: 114 BPM | RESPIRATION RATE: 18 BRPM | OXYGEN SATURATION: 99 % | TEMPERATURE: 100 F | SYSTOLIC BLOOD PRESSURE: 109 MMHG

## 2023-12-30 LAB
ALBUMIN UR-MCNC: NEGATIVE MG/DL
APPEARANCE UR: CLEAR
BACTERIA #/AREA URNS HPF: ABNORMAL /HPF
BILIRUB UR QL STRIP: NEGATIVE
COLOR UR AUTO: YELLOW
ERYTHROCYTE [DISTWIDTH] IN BLOOD BY AUTOMATED COUNT: 12.5 % (ref 10–15)
FLUAV RNA SPEC QL NAA+PROBE: NEGATIVE
FLUBV RNA RESP QL NAA+PROBE: NEGATIVE
GLUCOSE UR STRIP-MCNC: NEGATIVE MG/DL
HCT VFR BLD AUTO: 32.3 % (ref 35–47)
HGB BLD-MCNC: 10.6 G/DL (ref 11.7–15.7)
HGB UR QL STRIP: NEGATIVE
KETONES UR STRIP-MCNC: NEGATIVE MG/DL
LEUKOCYTE ESTERASE UR QL STRIP: NEGATIVE
MCH RBC QN AUTO: 26.5 PG (ref 26.5–33)
MCHC RBC AUTO-ENTMCNC: 32.8 G/DL (ref 31.5–36.5)
MCV RBC AUTO: 81 FL (ref 78–100)
NITRATE UR QL: NEGATIVE
PH UR STRIP: 8 [PH] (ref 5–7)
PLATELET # BLD AUTO: 159 10E3/UL (ref 150–450)
RBC # BLD AUTO: 4 10E6/UL (ref 3.8–5.2)
RBC URINE: <1 /HPF
RSV RNA SPEC NAA+PROBE: NEGATIVE
SARS-COV-2 RNA RESP QL NAA+PROBE: POSITIVE
SP GR UR STRIP: 1.02 (ref 1–1.03)
SQUAMOUS EPITHELIAL: 2 /HPF
UROBILINOGEN UR STRIP-MCNC: 2 MG/DL
WBC # BLD AUTO: 7.9 10E3/UL (ref 4–11)
WBC URINE: 1 /HPF

## 2023-12-30 PROCEDURE — 250N000013 HC RX MED GY IP 250 OP 250 PS 637: Performed by: FAMILY MEDICINE

## 2023-12-30 PROCEDURE — 87637 SARSCOV2&INF A&B&RSV AMP PRB: CPT | Performed by: FAMILY MEDICINE

## 2023-12-30 PROCEDURE — G0463 HOSPITAL OUTPT CLINIC VISIT: HCPCS

## 2023-12-30 PROCEDURE — 96361 HYDRATE IV INFUSION ADD-ON: CPT

## 2023-12-30 PROCEDURE — 96360 HYDRATION IV INFUSION INIT: CPT

## 2023-12-30 PROCEDURE — 81001 URINALYSIS AUTO W/SCOPE: CPT | Performed by: FAMILY MEDICINE

## 2023-12-30 PROCEDURE — 85027 COMPLETE CBC AUTOMATED: CPT | Performed by: FAMILY MEDICINE

## 2023-12-30 PROCEDURE — 258N000003 HC RX IP 258 OP 636: Performed by: FAMILY MEDICINE

## 2023-12-30 RX ORDER — ACETAMINOPHEN 325 MG/1
975 TABLET ORAL EVERY 6 HOURS PRN
Status: DISCONTINUED | OUTPATIENT
Start: 2023-12-30 | End: 2023-12-30 | Stop reason: HOSPADM

## 2023-12-30 RX ADMIN — ACETAMINOPHEN 975 MG: 325 TABLET, FILM COATED ORAL at 00:10

## 2023-12-30 RX ADMIN — SODIUM CHLORIDE, POTASSIUM CHLORIDE, SODIUM LACTATE AND CALCIUM CHLORIDE 500 ML: 600; 310; 30; 20 INJECTION, SOLUTION INTRAVENOUS at 00:30

## 2023-12-30 ASSESSMENT — ACTIVITIES OF DAILY LIVING (ADL): ADLS_ACUITY_SCORE: 20

## 2023-12-30 NOTE — PROGRESS NOTES
S: Triage Arrival  B: Ely is a 18 y.o.  @ 37w 3d who presents with complaint of fever and body aches. Is unsureif she is having contractions. No fluid leaking.  A: EFM applied. FHT's175 with moderate variability, accels present, no decels noted on strip. Contractions not seen but appears to have uterine irritability.  R: Will notify MD to obtain further orders.

## 2023-12-30 NOTE — DISCHARGE INSTRUCTIONS
Diet:  -Continue on regular diet as tolerated   -Drink 8-10 glasses of water and/or juice every day    Activity as tolerated    Reason for being seen in L&D      fever and contractions                Call the [facility] at [phone] if you have    5 or more contractions in one hour  Leaking of fluid from your vaginal area  Decreased fetal movement (follow kick count instructions)  Bleeding from your vaginal area  Swelling of your face, or increased swelling in your hands or legs  Headaches or vision problems such as blurring or seeing spots or stars  Nausea or vomiting lasting for more than 24 hours  An increase in weight (5lbs./week)  Epigastric pain (sometimes confused with heartburn that does not go away or a very bad stomach ache)    If you have any questions, please follow up with your doctor.

## 2023-12-30 NOTE — PROGRESS NOTES
S:  Discharge from triage.   A:  FHT's 145, Moderate variability, Accels present, no decels noted. Contractions not present. Was given tylenol and a fluid bolus. Feeling much better. FHR no longer tachy.   R:  Written and verbal D/C instruction provided. Pt. Verbalized understanding of D/C instructions and will follow up with Dr. Garrido as previously scheduled.   Verbalizes understanding that she will call or return to the [facility] with any further questions or concerns.   Pt. D/C'd via ambulatory with mother.    Nursing Discharge Checklist  Discharge order entered: Yes  Patient care order entered: Yes  Charges entered: Yes  IV start and stop times have been documented in Epic? Yes

## 2024-01-03 ENCOUNTER — PRENATAL OFFICE VISIT (OUTPATIENT)
Dept: FAMILY MEDICINE | Facility: CLINIC | Age: 19
End: 2024-01-03
Payer: COMMERCIAL

## 2024-01-03 VITALS
HEIGHT: 65 IN | BODY MASS INDEX: 35.99 KG/M2 | SYSTOLIC BLOOD PRESSURE: 110 MMHG | HEART RATE: 76 BPM | OXYGEN SATURATION: 100 % | WEIGHT: 216 LBS | DIASTOLIC BLOOD PRESSURE: 80 MMHG | RESPIRATION RATE: 20 BRPM | TEMPERATURE: 97.9 F

## 2024-01-03 DIAGNOSIS — Z34.03 SUPERVISION OF NORMAL FIRST PREGNANCY IN THIRD TRIMESTER: Primary | ICD-10-CM

## 2024-01-03 PROBLEM — D64.9 ANEMIA: Status: ACTIVE | Noted: 2021-03-05

## 2024-01-03 PROBLEM — E61.1 LOW IRON: Status: RESOLVED | Noted: 2021-03-05 | Resolved: 2024-01-03

## 2024-01-03 PROBLEM — E61.1 LOW IRON: Status: ACTIVE | Noted: 2021-03-05

## 2024-01-03 PROBLEM — L70.9 ACNE: Status: RESOLVED | Noted: 2019-09-18 | Resolved: 2024-01-03

## 2024-01-03 PROBLEM — Z36.89 ENCOUNTER FOR TRIAGE IN PREGNANT PATIENT: Status: RESOLVED | Noted: 2023-12-12 | Resolved: 2024-01-03

## 2024-01-03 PROBLEM — O60.00 PRETERM LABOR: Status: RESOLVED | Noted: 2023-11-14 | Resolved: 2024-01-03

## 2024-01-03 PROBLEM — O99.891 BACK PAIN AFFECTING PREGNANCY IN THIRD TRIMESTER: Status: RESOLVED | Noted: 2023-11-14 | Resolved: 2024-01-03

## 2024-01-03 PROBLEM — O47.9 UTERINE CONTRACTIONS DURING PREGNANCY: Status: RESOLVED | Noted: 2023-10-15 | Resolved: 2024-01-03

## 2024-01-03 PROBLEM — L67.1: Status: RESOLVED | Noted: 2019-09-18 | Resolved: 2024-01-03

## 2024-01-03 PROBLEM — F32.A DEPRESSIVE DISORDER: Status: RESOLVED | Noted: 2023-05-23 | Resolved: 2024-01-03

## 2024-01-03 PROBLEM — D64.9 ANEMIA: Status: RESOLVED | Noted: 2021-03-05 | Resolved: 2024-01-03

## 2024-01-03 PROBLEM — G89.29 CHRONIC NECK PAIN: Status: RESOLVED | Noted: 2019-09-18 | Resolved: 2024-01-03

## 2024-01-03 PROBLEM — M54.9 BACK PAIN AFFECTING PREGNANCY IN THIRD TRIMESTER: Status: RESOLVED | Noted: 2023-11-14 | Resolved: 2024-01-03

## 2024-01-03 PROBLEM — M54.2 CHRONIC NECK PAIN: Status: RESOLVED | Noted: 2019-09-18 | Resolved: 2024-01-03

## 2024-01-03 PROCEDURE — 99207 PR PRENATAL VISIT: CPT | Performed by: FAMILY MEDICINE

## 2024-01-03 RX ORDER — TRAZODONE HYDROCHLORIDE 50 MG/1
TABLET, FILM COATED ORAL
Status: ON HOLD | COMMUNITY
Start: 2024-01-02 | End: 2024-01-12

## 2024-01-03 ASSESSMENT — PAIN SCALES - GENERAL: PAINLEVEL: MODERATE PAIN (4)

## 2024-01-03 NOTE — PROGRESS NOTES
Concerns:   Doing well.  No concerns today.  No vaginal bleeding, LOF, contractions.  No HA, RUQ pain, N/V, visual changes.  Labor precautions discussed.  RTC 1 week.    Pregnancy Issues              1. Childhood asthma              2. History of menorrhagia needing Fe transfusions.               3. Constipation. On miralax and senna            Prenatal care plan              - prior deliveries: none  -OB Labs of concern: none. cfDNA NL              - Rh- pos              - First trimester screening:  Discussed completed              - Second trimester screening:  Discussed completed              - Labor pain management:  ?              - Ped:  RH              - Circumcision if boy: Yes              - BC: unsure              - Breast feeding:  Yes              - Covid 19 vaccine: no              - influenza vaccine: done              - Tdap done              - RSV declined     Marcelino Garrido MD

## 2024-01-09 ENCOUNTER — TELEPHONE (OUTPATIENT)
Dept: FAMILY MEDICINE | Facility: CLINIC | Age: 19
End: 2024-01-09

## 2024-01-09 ENCOUNTER — PRENATAL OFFICE VISIT (OUTPATIENT)
Dept: FAMILY MEDICINE | Facility: CLINIC | Age: 19
End: 2024-01-09
Payer: COMMERCIAL

## 2024-01-09 VITALS
RESPIRATION RATE: 20 BRPM | TEMPERATURE: 97.9 F | OXYGEN SATURATION: 98 % | WEIGHT: 214.8 LBS | HEIGHT: 66 IN | SYSTOLIC BLOOD PRESSURE: 108 MMHG | HEART RATE: 105 BPM | DIASTOLIC BLOOD PRESSURE: 68 MMHG | BODY MASS INDEX: 34.52 KG/M2

## 2024-01-09 DIAGNOSIS — Z34.03 SUPERVISION OF NORMAL FIRST PREGNANCY IN THIRD TRIMESTER: Primary | ICD-10-CM

## 2024-01-09 PROCEDURE — 99207 PR PRENATAL VISIT: CPT | Performed by: FAMILY MEDICINE

## 2024-01-09 RX ORDER — CARBOPROST TROMETHAMINE 250 UG/ML
250 INJECTION, SOLUTION INTRAMUSCULAR
Status: CANCELLED | OUTPATIENT
Start: 2024-01-09

## 2024-01-09 RX ORDER — NALOXONE HYDROCHLORIDE 0.4 MG/ML
0.4 INJECTION, SOLUTION INTRAMUSCULAR; INTRAVENOUS; SUBCUTANEOUS
Status: CANCELLED | OUTPATIENT
Start: 2024-01-09

## 2024-01-09 RX ORDER — MISOPROSTOL 200 UG/1
400 TABLET ORAL
Status: CANCELLED | OUTPATIENT
Start: 2024-01-09

## 2024-01-09 RX ORDER — OXYTOCIN 10 [USP'U]/ML
10 INJECTION, SOLUTION INTRAMUSCULAR; INTRAVENOUS
Status: CANCELLED | OUTPATIENT
Start: 2024-01-09

## 2024-01-09 RX ORDER — TRANEXAMIC ACID 10 MG/ML
1 INJECTION, SOLUTION INTRAVENOUS EVERY 30 MIN PRN
Status: CANCELLED | OUTPATIENT
Start: 2024-01-09

## 2024-01-09 RX ORDER — PROCHLORPERAZINE 25 MG
25 SUPPOSITORY, RECTAL RECTAL EVERY 12 HOURS PRN
Status: CANCELLED | OUTPATIENT
Start: 2024-01-09

## 2024-01-09 RX ORDER — METOCLOPRAMIDE HYDROCHLORIDE 5 MG/ML
10 INJECTION INTRAMUSCULAR; INTRAVENOUS EVERY 6 HOURS PRN
Status: CANCELLED | OUTPATIENT
Start: 2024-01-09

## 2024-01-09 RX ORDER — IBUPROFEN 200 MG
800 TABLET ORAL
Status: CANCELLED | OUTPATIENT
Start: 2024-01-09 | End: 2024-01-14

## 2024-01-09 RX ORDER — OXYTOCIN/0.9 % SODIUM CHLORIDE 30/500 ML
340 PLASTIC BAG, INJECTION (ML) INTRAVENOUS CONTINUOUS PRN
Status: CANCELLED | OUTPATIENT
Start: 2024-01-09

## 2024-01-09 RX ORDER — ONDANSETRON 2 MG/ML
4 INJECTION INTRAMUSCULAR; INTRAVENOUS EVERY 6 HOURS PRN
Status: CANCELLED | OUTPATIENT
Start: 2024-01-09

## 2024-01-09 RX ORDER — METHYLERGONOVINE MALEATE 0.2 MG/ML
200 INJECTION INTRAVENOUS
Status: CANCELLED | OUTPATIENT
Start: 2024-01-09

## 2024-01-09 RX ORDER — LIDOCAINE 40 MG/G
CREAM TOPICAL
Status: CANCELLED | OUTPATIENT
Start: 2024-01-09

## 2024-01-09 RX ORDER — KETOROLAC TROMETHAMINE 30 MG/ML
30 INJECTION, SOLUTION INTRAMUSCULAR; INTRAVENOUS
Status: CANCELLED | OUTPATIENT
Start: 2024-01-09 | End: 2024-01-14

## 2024-01-09 RX ORDER — SODIUM CHLORIDE, SODIUM LACTATE, POTASSIUM CHLORIDE, CALCIUM CHLORIDE 600; 310; 30; 20 MG/100ML; MG/100ML; MG/100ML; MG/100ML
INJECTION, SOLUTION INTRAVENOUS CONTINUOUS PRN
Status: CANCELLED | OUTPATIENT
Start: 2024-01-09

## 2024-01-09 RX ORDER — METOCLOPRAMIDE 5 MG/1
10 TABLET ORAL EVERY 6 HOURS PRN
Status: CANCELLED | OUTPATIENT
Start: 2024-01-09

## 2024-01-09 RX ORDER — NALOXONE HYDROCHLORIDE 0.4 MG/ML
0.2 INJECTION, SOLUTION INTRAMUSCULAR; INTRAVENOUS; SUBCUTANEOUS
Status: CANCELLED | OUTPATIENT
Start: 2024-01-09

## 2024-01-09 RX ORDER — PROCHLORPERAZINE MALEATE 5 MG
10 TABLET ORAL EVERY 6 HOURS PRN
Status: CANCELLED | OUTPATIENT
Start: 2024-01-09

## 2024-01-09 RX ORDER — OXYTOCIN/0.9 % SODIUM CHLORIDE 30/500 ML
1-24 PLASTIC BAG, INJECTION (ML) INTRAVENOUS CONTINUOUS
Status: CANCELLED | OUTPATIENT
Start: 2024-01-09

## 2024-01-09 RX ORDER — ONDANSETRON 4 MG/1
4 TABLET, ORALLY DISINTEGRATING ORAL EVERY 6 HOURS PRN
Status: CANCELLED | OUTPATIENT
Start: 2024-01-09

## 2024-01-09 RX ORDER — CITRIC ACID/SODIUM CITRATE 334-500MG
30 SOLUTION, ORAL ORAL
Status: CANCELLED | OUTPATIENT
Start: 2024-01-09

## 2024-01-09 RX ORDER — OXYTOCIN/0.9 % SODIUM CHLORIDE 30/500 ML
100-340 PLASTIC BAG, INJECTION (ML) INTRAVENOUS CONTINUOUS PRN
Status: CANCELLED | OUTPATIENT
Start: 2024-01-09

## 2024-01-09 ASSESSMENT — PAIN SCALES - GENERAL: PAINLEVEL: MILD PAIN (3)

## 2024-01-09 NOTE — PROGRESS NOTES
Concerns:   Doing well.  No concerns today.  No vaginal bleeding, LOF, contractions.  No HA, RUQ pain, N/V, visual changes.  Labor precautions discussed.  RTC 1 week.  Adams 8. Would like elective induction.  Risk benefits alternatives discussed.  Set up for 2 days from now.  Orders in.      Pregnancy Issues              1. Childhood asthma              2. History of menorrhagia needing Fe transfusions.               3. Constipation. On miralax and senna            Prenatal care plan              - prior deliveries: none  -OB Labs of concern: none. cfDNA NL              - Rh- pos              - First trimester screening:  Discussed completed              - Second trimester screening:  Discussed completed              - Labor pain management:  ?              - Ped:  RH              - Circumcision if boy: Yes              - BC: unsure              - Breast feeding:  Yes              - Covid 19 vaccine: no              - influenza vaccine: done              - Tdap done              - RSV declined  Marcelino Garrido MD

## 2024-01-09 NOTE — TELEPHONE ENCOUNTER
Called patient per Dr. Garrido to inform her that pitocin induction has been scheduled for Thursday 1/11/24 at 7:30am.     Labor and delivery wanted us to inform patient when calling her to let her know that she can eat before she comes, it is recommended that she showers before, she can register right up on the OB floor, and she can bring snacks and her bags right up with her when she comes.     Patient was informed and had no questions.     Jina Cobb LPN

## 2024-01-11 ENCOUNTER — ANESTHESIA EVENT (OUTPATIENT)
Dept: OBGYN | Facility: CLINIC | Age: 19
End: 2024-01-11
Payer: COMMERCIAL

## 2024-01-11 ENCOUNTER — HOSPITAL ENCOUNTER (INPATIENT)
Facility: CLINIC | Age: 19
LOS: 1 days | Discharge: HOME | End: 2024-01-12
Attending: FAMILY MEDICINE | Admitting: FAMILY MEDICINE
Payer: COMMERCIAL

## 2024-01-11 ENCOUNTER — ANESTHESIA (OUTPATIENT)
Dept: OBGYN | Facility: CLINIC | Age: 19
End: 2024-01-11
Payer: COMMERCIAL

## 2024-01-11 PROBLEM — Z37.9 NORMAL LABOR: Status: ACTIVE | Noted: 2024-01-11

## 2024-01-11 LAB
ABO/RH(D): NORMAL
ANTIBODY SCREEN: NEGATIVE
BASOPHILS # BLD AUTO: 0 10E3/UL (ref 0–0.2)
BASOPHILS NFR BLD AUTO: 0 %
EOSINOPHIL # BLD AUTO: 0.1 10E3/UL (ref 0–0.7)
EOSINOPHIL NFR BLD AUTO: 0 %
ERYTHROCYTE [DISTWIDTH] IN BLOOD BY AUTOMATED COUNT: 13.5 % (ref 10–15)
HCT VFR BLD AUTO: 31.7 % (ref 35–47)
HGB BLD-MCNC: 10.6 G/DL (ref 11.7–15.7)
IMM GRANULOCYTES # BLD: 0.1 10E3/UL
IMM GRANULOCYTES NFR BLD: 1 %
LYMPHOCYTES # BLD AUTO: 1.3 10E3/UL (ref 0.8–5.3)
LYMPHOCYTES NFR BLD AUTO: 9 %
MCH RBC QN AUTO: 26.4 PG (ref 26.5–33)
MCHC RBC AUTO-ENTMCNC: 33.4 G/DL (ref 31.5–36.5)
MCV RBC AUTO: 79 FL (ref 78–100)
MONOCYTES # BLD AUTO: 0.8 10E3/UL (ref 0–1.3)
MONOCYTES NFR BLD AUTO: 6 %
NEUTROPHILS # BLD AUTO: 11.6 10E3/UL (ref 1.6–8.3)
NEUTROPHILS NFR BLD AUTO: 84 %
NRBC # BLD AUTO: 0 10E3/UL
NRBC BLD AUTO-RTO: 0 /100
PLATELET # BLD AUTO: 209 10E3/UL (ref 150–450)
RBC # BLD AUTO: 4.01 10E6/UL (ref 3.8–5.2)
SPECIMEN EXPIRATION DATE: NORMAL
T PALLIDUM AB SER QL: NONREACTIVE
WBC # BLD AUTO: 13.9 10E3/UL (ref 4–11)

## 2024-01-11 PROCEDURE — 86780 TREPONEMA PALLIDUM: CPT | Performed by: FAMILY MEDICINE

## 2024-01-11 PROCEDURE — 250N000011 HC RX IP 250 OP 636: Performed by: NURSE ANESTHETIST, CERTIFIED REGISTERED

## 2024-01-11 PROCEDURE — 250N000009 HC RX 250: Performed by: FAMILY MEDICINE

## 2024-01-11 PROCEDURE — 120N000001 HC R&B MED SURG/OB

## 2024-01-11 PROCEDURE — 250N000009 HC RX 250: Performed by: NURSE ANESTHETIST, CERTIFIED REGISTERED

## 2024-01-11 PROCEDURE — 85004 AUTOMATED DIFF WBC COUNT: CPT | Performed by: FAMILY MEDICINE

## 2024-01-11 PROCEDURE — 258N000003 HC RX IP 258 OP 636: Performed by: FAMILY MEDICINE

## 2024-01-11 PROCEDURE — 250N000013 HC RX MED GY IP 250 OP 250 PS 637: Performed by: FAMILY MEDICINE

## 2024-01-11 PROCEDURE — 370N000003 HC ANESTHESIA WARD SERVICE: Performed by: NURSE ANESTHETIST, CERTIFIED REGISTERED

## 2024-01-11 PROCEDURE — 250N000011 HC RX IP 250 OP 636: Mod: JZ | Performed by: FAMILY MEDICINE

## 2024-01-11 PROCEDURE — 86900 BLOOD TYPING SEROLOGIC ABO: CPT | Performed by: FAMILY MEDICINE

## 2024-01-11 RX ORDER — POLYMYXIN B SULFATE AND TRIMETHOPRIM 1; 10000 MG/ML; [USP'U]/ML
1 SOLUTION OPHTHALMIC 4 TIMES DAILY
Status: DISCONTINUED | OUTPATIENT
Start: 2024-01-11 | End: 2024-01-12 | Stop reason: HOSPADM

## 2024-01-11 RX ORDER — ONDANSETRON 2 MG/ML
4 INJECTION INTRAMUSCULAR; INTRAVENOUS EVERY 6 HOURS PRN
Status: DISCONTINUED | OUTPATIENT
Start: 2024-01-11 | End: 2024-01-12 | Stop reason: HOSPADM

## 2024-01-11 RX ORDER — OXYTOCIN/0.9 % SODIUM CHLORIDE 30/500 ML
340 PLASTIC BAG, INJECTION (ML) INTRAVENOUS CONTINUOUS PRN
Status: DISCONTINUED | OUTPATIENT
Start: 2024-01-11 | End: 2024-01-12 | Stop reason: HOSPADM

## 2024-01-11 RX ORDER — METOCLOPRAMIDE 5 MG/1
10 TABLET ORAL EVERY 6 HOURS PRN
Status: DISCONTINUED | OUTPATIENT
Start: 2024-01-11 | End: 2024-01-12 | Stop reason: HOSPADM

## 2024-01-11 RX ORDER — OXYTOCIN/0.9 % SODIUM CHLORIDE 30/500 ML
1-24 PLASTIC BAG, INJECTION (ML) INTRAVENOUS CONTINUOUS
Status: DISCONTINUED | OUTPATIENT
Start: 2024-01-11 | End: 2024-01-12 | Stop reason: HOSPADM

## 2024-01-11 RX ORDER — NALOXONE HYDROCHLORIDE 0.4 MG/ML
0.2 INJECTION, SOLUTION INTRAMUSCULAR; INTRAVENOUS; SUBCUTANEOUS
Status: DISCONTINUED | OUTPATIENT
Start: 2024-01-11 | End: 2024-01-12 | Stop reason: HOSPADM

## 2024-01-11 RX ORDER — NALOXONE HYDROCHLORIDE 0.4 MG/ML
0.4 INJECTION, SOLUTION INTRAMUSCULAR; INTRAVENOUS; SUBCUTANEOUS
Status: DISCONTINUED | OUTPATIENT
Start: 2024-01-11 | End: 2024-01-12 | Stop reason: HOSPADM

## 2024-01-11 RX ORDER — ONDANSETRON 4 MG/1
4 TABLET, ORALLY DISINTEGRATING ORAL EVERY 6 HOURS PRN
Status: DISCONTINUED | OUTPATIENT
Start: 2024-01-11 | End: 2024-01-12 | Stop reason: HOSPADM

## 2024-01-11 RX ORDER — PROCHLORPERAZINE 25 MG
25 SUPPOSITORY, RECTAL RECTAL EVERY 12 HOURS PRN
Status: DISCONTINUED | OUTPATIENT
Start: 2024-01-11 | End: 2024-01-12 | Stop reason: HOSPADM

## 2024-01-11 RX ORDER — OXYTOCIN 10 [USP'U]/ML
10 INJECTION, SOLUTION INTRAMUSCULAR; INTRAVENOUS
Status: DISCONTINUED | OUTPATIENT
Start: 2024-01-11 | End: 2024-01-12 | Stop reason: HOSPADM

## 2024-01-11 RX ORDER — CARBOPROST TROMETHAMINE 250 UG/ML
250 INJECTION, SOLUTION INTRAMUSCULAR
Status: DISCONTINUED | OUTPATIENT
Start: 2024-01-11 | End: 2024-01-12 | Stop reason: HOSPADM

## 2024-01-11 RX ORDER — MISOPROSTOL 200 UG/1
400 TABLET ORAL
Status: DISCONTINUED | OUTPATIENT
Start: 2024-01-11 | End: 2024-01-12 | Stop reason: HOSPADM

## 2024-01-11 RX ORDER — SODIUM CHLORIDE, SODIUM LACTATE, POTASSIUM CHLORIDE, CALCIUM CHLORIDE 600; 310; 30; 20 MG/100ML; MG/100ML; MG/100ML; MG/100ML
INJECTION, SOLUTION INTRAVENOUS CONTINUOUS PRN
Status: DISCONTINUED | OUTPATIENT
Start: 2024-01-11 | End: 2024-01-12 | Stop reason: HOSPADM

## 2024-01-11 RX ORDER — NALBUPHINE HYDROCHLORIDE 10 MG/ML
2.5-5 INJECTION, SOLUTION INTRAMUSCULAR; INTRAVENOUS; SUBCUTANEOUS EVERY 6 HOURS PRN
Status: DISCONTINUED | OUTPATIENT
Start: 2024-01-11 | End: 2024-01-12 | Stop reason: HOSPADM

## 2024-01-11 RX ORDER — PROCHLORPERAZINE MALEATE 5 MG
10 TABLET ORAL EVERY 6 HOURS PRN
Status: DISCONTINUED | OUTPATIENT
Start: 2024-01-11 | End: 2024-01-12 | Stop reason: HOSPADM

## 2024-01-11 RX ORDER — METHYLERGONOVINE MALEATE 0.2 MG/ML
200 INJECTION INTRAVENOUS
Status: DISCONTINUED | OUTPATIENT
Start: 2024-01-11 | End: 2024-01-12 | Stop reason: HOSPADM

## 2024-01-11 RX ORDER — CITRIC ACID/SODIUM CITRATE 334-500MG
30 SOLUTION, ORAL ORAL
Status: DISCONTINUED | OUTPATIENT
Start: 2024-01-11 | End: 2024-01-12 | Stop reason: HOSPADM

## 2024-01-11 RX ORDER — METOCLOPRAMIDE HYDROCHLORIDE 5 MG/ML
10 INJECTION INTRAMUSCULAR; INTRAVENOUS EVERY 6 HOURS PRN
Status: DISCONTINUED | OUTPATIENT
Start: 2024-01-11 | End: 2024-01-12 | Stop reason: HOSPADM

## 2024-01-11 RX ORDER — LIDOCAINE 40 MG/G
CREAM TOPICAL
Status: DISCONTINUED | OUTPATIENT
Start: 2024-01-11 | End: 2024-01-12 | Stop reason: HOSPADM

## 2024-01-11 RX ORDER — FENTANYL CITRATE 50 UG/ML
100 INJECTION, SOLUTION INTRAMUSCULAR; INTRAVENOUS
Status: DISCONTINUED | OUTPATIENT
Start: 2024-01-11 | End: 2024-01-12 | Stop reason: HOSPADM

## 2024-01-11 RX ORDER — FENTANYL CITRATE-0.9 % NACL/PF 10 MCG/ML
100 PLASTIC BAG, INJECTION (ML) INTRAVENOUS EVERY 5 MIN PRN
Status: DISCONTINUED | OUTPATIENT
Start: 2024-01-11 | End: 2024-01-12 | Stop reason: HOSPADM

## 2024-01-11 RX ORDER — TRANEXAMIC ACID 10 MG/ML
1 INJECTION, SOLUTION INTRAVENOUS EVERY 30 MIN PRN
Status: DISCONTINUED | OUTPATIENT
Start: 2024-01-11 | End: 2024-01-12 | Stop reason: HOSPADM

## 2024-01-11 RX ADMIN — FENTANYL CITRATE 100 MCG: 50 INJECTION, SOLUTION INTRAMUSCULAR; INTRAVENOUS at 21:25

## 2024-01-11 RX ADMIN — SODIUM CHLORIDE, POTASSIUM CHLORIDE, SODIUM LACTATE AND CALCIUM CHLORIDE: 600; 310; 30; 20 INJECTION, SOLUTION INTRAVENOUS at 08:50

## 2024-01-11 RX ADMIN — Medication 2 MILLI-UNITS/MIN: at 09:23

## 2024-01-11 RX ADMIN — Medication 10 ML/HR: at 23:12

## 2024-01-11 RX ADMIN — BUPIVACAINE HYDROCHLORIDE 5 ML: 2.5 INJECTION, SOLUTION EPIDURAL; INFILTRATION; INTRACAUDAL at 23:05

## 2024-01-11 RX ADMIN — SODIUM CHLORIDE, POTASSIUM CHLORIDE, SODIUM LACTATE AND CALCIUM CHLORIDE 1000 ML: 600; 310; 30; 20 INJECTION, SOLUTION INTRAVENOUS at 22:18

## 2024-01-11 RX ADMIN — METOCLOPRAMIDE HYDROCHLORIDE 10 MG: 5 INJECTION INTRAMUSCULAR; INTRAVENOUS at 20:22

## 2024-01-11 RX ADMIN — SODIUM CHLORIDE, POTASSIUM CHLORIDE, SODIUM LACTATE AND CALCIUM CHLORIDE: 600; 310; 30; 20 INJECTION, SOLUTION INTRAVENOUS at 16:28

## 2024-01-11 RX ADMIN — FENTANYL CITRATE 100 MCG: 50 INJECTION, SOLUTION INTRAMUSCULAR; INTRAVENOUS at 22:24

## 2024-01-11 RX ADMIN — BUPIVACAINE HYDROCHLORIDE 5 ML: 2.5 INJECTION, SOLUTION EPIDURAL; INFILTRATION; INTRACAUDAL at 23:06

## 2024-01-11 RX ADMIN — Medication 10 ML/HR: at 23:10

## 2024-01-11 RX ADMIN — POLYMYXIN B SULFATE AND TRIMETHOPRIM SULFATE 1 DROP: 10000; 1 SOLUTION/ DROPS OPHTHALMIC at 17:53

## 2024-01-11 ASSESSMENT — ACTIVITIES OF DAILY LIVING (ADL)
ADLS_ACUITY_SCORE: 20

## 2024-01-11 NOTE — PROGRESS NOTES
Dr. Garrido here to perform AROM at 1722, fluid color  clear and moderste amount noted. Cervix is now 3 cm.,mid, 60-70%,+1 station. Pitocin decreased from 22 mu/hr to 12 mu/hr. Patient tolerated well, repositioned and FHR stable after procedure. Decision made per Dr. Garrido to hold on internal monitoring for now and encourage further walking in the halls. Will continue to observe for progress and recheck cervix as needed. Client placed on birthing ball. Eye drops ordered for apparent conjunctivitis.

## 2024-01-11 NOTE — PROGRESS NOTES
Physician assessment; late entry  Dr. Garrido at bedside to assess. SVE for cervical change; 2 cm, 60%, mid-position, soft, head +1 station. Plan made to continue with pitocin protocol and he would be back to unit after his clinical hours to assess for changes and possible change in plan of care. Questions answered for client and offered encouragement. Family very supportive. Client remains on birthing ball and will continue with periodic ambulation in the halls as well.

## 2024-01-11 NOTE — H&P
"[facility] Labor and Delivery History and Physical    Ely Pritchard MRN# [id number]   Age: 18 year old YOB: 2005     Date of Admission:  2024    Primary care provider: Janie Tai           Chief Complaint:   Ely Pritchard is a 18 year old female who is 39w1d pregnant and being admitted for eIOL. Feels well.     Prenatal record reviewed. .          Pregnancy history:     OBSTETRIC HISTORY:    OB History    Para Term  AB Living   1 0 0 0 0 0   SAB IAB Ectopic Multiple Live Births   0 0 0 0 0      # Outcome Date GA Lbr Galileo/2nd Weight Sex Delivery Anes PTL Lv   1 Current               Obstetric Comments   EDC  based on LMP, SO Apolinar, no exposure to tobacco, cats- education, no safety concerns, works outside of the home-[employer].       EDC: Estimated Date of Delivery: 2024    Prenatal Labs:   Lab Results   Component Value Date    AS Negative 2023    HEPBANG Nonreactive 2023    CHPCRT Negative 2023    GCPCRT Negative 2023    HGB 10.6 (L) 2023       GBS Status:   No results found for: \"GBS\"    Active Problem List  Patient Active Problem List   Diagnosis    Childhood overweight, BMI 85-94.9 percentile    Anxiety    Supervision of normal first pregnancy in third trimester    Urinary tract infection in mother during third trimester of pregnancy       Medication Prior to Admission  Medications Prior to Admission   Medication Sig Dispense Refill Last Dose    FLUoxetine (PROZAC) 20 MG capsule Take 40 mg by mouth daily   2024 at 0700    ondansetron (ZOFRAN ODT) 4 MG ODT tab Take 1 tablet (4 mg) by mouth every 6 hours as needed for nausea 20 tablet 0 Past Month    Prenatal Vit-Fe Fumarate-FA (PNV PRENATAL PLUS MULTIVITAMIN) 27-1 MG TABS per tablet Take 1 tablet by mouth daily   2024 at 0700    traZODone (DESYREL) 50 MG tablet    1/10/2024 at 2100   .        Maternal Past Medical History:     Past Medical History: "   Diagnosis Date    Anemia     Depressive disorder 2018    Mild persistent asthma     resolved    Pneumonia due to Mycoplasma pneumoniae 02/24/2007    hospitalized    Primary nocturnal enuresis     Unspecified sinusitis (chronic)                        Family History:   I have reviewed this patient's family history and commented on sigificant items within the HPI            Social History:   I have reviewed this patient's social history and commented on significant items within the HPI         Review of Systems:   The Review of Systems is negative other than noted in the HPI          Physical Exam:                      No data found.    gen - well appearing  CV - RRR  Lungs: CTAB  Abd- gravid, non tender    Extremities:   Warm, well perfused  Edema absent     Cervix:   Per RN  Cephalic    Fetal Heart Rate Tracing: reactive and reassuring  Tocometer: external monitor     EFW: 7-8 lb                 Assessment:   Ely Pritchard is a 39w1d pregnant female admitted with IOL.          Plan:   Admit - see IP orders  Labor induction with Pitocin      Marcelino Garrido MD

## 2024-01-11 NOTE — PROGRESS NOTES
S:  Admission; late entry  B:  Ely is a  @ 39w1d gestation. Elective induction planned for family history of large babies. GBS negative, Hep. B negative, pregnancy complicated by unplanned pregnancy and decision of SO to end their relationship. Client has a very supportive relationship with her parents who are setting up her housing and infant care support. New SO, Gagandeep, supportive as well. EFW 8# per MD. Client's mother and other female relatives had both large babies and quick deliveries. Mother, Judith, at bedside as her primary support.  A:  Patient admitted to room 331 for pitocin induction  R: Dr. Garrido came to unit to great the client/family and will be available throughout the day in the clinic.

## 2024-01-11 NOTE — PROGRESS NOTES
S: Induction of labor  B: Patient is a  who presents for scheduled elective induction of labor @ 39w1d gestation. EFW 8#  A: IV Oxytocin per OB protocol beginning at 2 mU/min at 0923. SVE per this niurse; 1-2 cn, 60%, soft, posterior, -2.  Patient has contractions every 9-10 min. Lasting 30-40 sec. Pt rates her pain at a 0/10. Fetal and uterine monitoring changed from external monitors to mirian monitors. Understands that she will be on continuous EFM throughout induction. FHR baseline is 135 with moderate variability. Patient agrees with plan of care.   R: Will observe for active phase of labor and fetal tolerance.

## 2024-01-12 ENCOUNTER — LACTATION ENCOUNTER (OUTPATIENT)
Dept: OTHER | Facility: CLINIC | Age: 19
End: 2024-01-12

## 2024-01-12 ENCOUNTER — MEDICAL CORRESPONDENCE (OUTPATIENT)
Dept: HEALTH INFORMATION MANAGEMENT | Facility: CLINIC | Age: 19
End: 2024-01-12

## 2024-01-12 VITALS
RESPIRATION RATE: 19 BRPM | SYSTOLIC BLOOD PRESSURE: 110 MMHG | HEART RATE: 74 BPM | TEMPERATURE: 98 F | DIASTOLIC BLOOD PRESSURE: 62 MMHG | OXYGEN SATURATION: 98 %

## 2024-01-12 LAB — HGB BLD-MCNC: 9.2 G/DL (ref 11.7–15.7)

## 2024-01-12 PROCEDURE — 85018 HEMOGLOBIN: CPT | Performed by: FAMILY MEDICINE

## 2024-01-12 PROCEDURE — 00HU33Z INSERTION OF INFUSION DEVICE INTO SPINAL CANAL, PERCUTANEOUS APPROACH: ICD-10-PCS | Performed by: FAMILY MEDICINE

## 2024-01-12 PROCEDURE — 10907ZC DRAINAGE OF AMNIOTIC FLUID, THERAPEUTIC FROM PRODUCTS OF CONCEPTION, VIA NATURAL OR ARTIFICIAL OPENING: ICD-10-PCS | Performed by: FAMILY MEDICINE

## 2024-01-12 PROCEDURE — 0HQ9XZZ REPAIR PERINEUM SKIN, EXTERNAL APPROACH: ICD-10-PCS | Performed by: FAMILY MEDICINE

## 2024-01-12 PROCEDURE — 59400 OBSTETRICAL CARE: CPT | Performed by: FAMILY MEDICINE

## 2024-01-12 PROCEDURE — 3E0R3BZ INTRODUCTION OF ANESTHETIC AGENT INTO SPINAL CANAL, PERCUTANEOUS APPROACH: ICD-10-PCS | Performed by: FAMILY MEDICINE

## 2024-01-12 PROCEDURE — 250N000013 HC RX MED GY IP 250 OP 250 PS 637: Performed by: FAMILY MEDICINE

## 2024-01-12 PROCEDURE — 36415 COLL VENOUS BLD VENIPUNCTURE: CPT | Performed by: FAMILY MEDICINE

## 2024-01-12 PROCEDURE — 722N000001 HC LABOR CARE VAGINAL DELIVERY SINGLE

## 2024-01-12 RX ORDER — OXYTOCIN 10 [USP'U]/ML
10 INJECTION, SOLUTION INTRAMUSCULAR; INTRAVENOUS
Status: DISCONTINUED | OUTPATIENT
Start: 2024-01-12 | End: 2024-01-12 | Stop reason: HOSPADM

## 2024-01-12 RX ORDER — ACETAMINOPHEN 325 MG/1
650 TABLET ORAL EVERY 4 HOURS PRN
COMMUNITY
Start: 2024-01-12

## 2024-01-12 RX ORDER — OXYTOCIN/0.9 % SODIUM CHLORIDE 30/500 ML
340 PLASTIC BAG, INJECTION (ML) INTRAVENOUS CONTINUOUS PRN
Status: DISCONTINUED | OUTPATIENT
Start: 2024-01-12 | End: 2024-01-12 | Stop reason: HOSPADM

## 2024-01-12 RX ORDER — HYDROCORTISONE 25 MG/G
CREAM TOPICAL 3 TIMES DAILY PRN
Status: DISCONTINUED | OUTPATIENT
Start: 2024-01-12 | End: 2024-01-12 | Stop reason: HOSPADM

## 2024-01-12 RX ORDER — BUPIVACAINE HYDROCHLORIDE 2.5 MG/ML
INJECTION, SOLUTION EPIDURAL; INFILTRATION; INTRACAUDAL PRN
Status: DISCONTINUED | OUTPATIENT
Start: 2024-01-11 | End: 2024-01-12

## 2024-01-12 RX ORDER — KETOROLAC TROMETHAMINE 30 MG/ML
30 INJECTION, SOLUTION INTRAMUSCULAR; INTRAVENOUS
Status: DISCONTINUED | OUTPATIENT
Start: 2024-01-12 | End: 2024-01-12

## 2024-01-12 RX ORDER — LIDOCAINE HYDROCHLORIDE AND EPINEPHRINE 15; 5 MG/ML; UG/ML
INJECTION, SOLUTION EPIDURAL PRN
Status: DISCONTINUED | OUTPATIENT
Start: 2024-01-11 | End: 2024-01-12

## 2024-01-12 RX ORDER — TRANEXAMIC ACID 10 MG/ML
1 INJECTION, SOLUTION INTRAVENOUS EVERY 30 MIN PRN
Status: DISCONTINUED | OUTPATIENT
Start: 2024-01-12 | End: 2024-01-12 | Stop reason: HOSPADM

## 2024-01-12 RX ORDER — METHYLERGONOVINE MALEATE 0.2 MG/ML
200 INJECTION INTRAVENOUS
Status: DISCONTINUED | OUTPATIENT
Start: 2024-01-12 | End: 2024-01-12 | Stop reason: HOSPADM

## 2024-01-12 RX ORDER — FENTANYL/BUPIVACAINE/NS/PF 2-1250MCG
PLASTIC BAG, INJECTION (ML) INJECTION CONTINUOUS PRN
Status: DISCONTINUED | OUTPATIENT
Start: 2024-01-11 | End: 2024-01-12

## 2024-01-12 RX ORDER — BISACODYL 10 MG
10 SUPPOSITORY, RECTAL RECTAL DAILY PRN
Status: DISCONTINUED | OUTPATIENT
Start: 2024-01-12 | End: 2024-01-12 | Stop reason: HOSPADM

## 2024-01-12 RX ORDER — MODIFIED LANOLIN
OINTMENT (GRAM) TOPICAL
Status: DISCONTINUED | OUTPATIENT
Start: 2024-01-12 | End: 2024-01-12 | Stop reason: HOSPADM

## 2024-01-12 RX ORDER — IBUPROFEN 800 MG/1
800 TABLET, FILM COATED ORAL EVERY 6 HOURS PRN
COMMUNITY
Start: 2024-01-12

## 2024-01-12 RX ORDER — DEXTROSE MONOHYDRATE 100 MG/ML
INJECTION, SOLUTION INTRAVENOUS CONTINUOUS
Status: DISCONTINUED | OUTPATIENT
Start: 2024-01-12 | End: 2024-01-12

## 2024-01-12 RX ORDER — IBUPROFEN 800 MG/1
800 TABLET, FILM COATED ORAL EVERY 6 HOURS PRN
Status: DISCONTINUED | OUTPATIENT
Start: 2024-01-12 | End: 2024-01-12 | Stop reason: HOSPADM

## 2024-01-12 RX ORDER — DOCUSATE SODIUM 100 MG/1
100 CAPSULE, LIQUID FILLED ORAL DAILY
Status: DISCONTINUED | OUTPATIENT
Start: 2024-01-12 | End: 2024-01-12 | Stop reason: HOSPADM

## 2024-01-12 RX ORDER — ACETAMINOPHEN 325 MG/1
650 TABLET ORAL EVERY 4 HOURS PRN
Status: DISCONTINUED | OUTPATIENT
Start: 2024-01-12 | End: 2024-01-12 | Stop reason: HOSPADM

## 2024-01-12 RX ORDER — MISOPROSTOL 200 UG/1
400 TABLET ORAL
Status: DISCONTINUED | OUTPATIENT
Start: 2024-01-12 | End: 2024-01-12 | Stop reason: HOSPADM

## 2024-01-12 RX ORDER — IBUPROFEN 800 MG/1
800 TABLET, FILM COATED ORAL
Status: DISCONTINUED | OUTPATIENT
Start: 2024-01-12 | End: 2024-01-12

## 2024-01-12 RX ORDER — CARBOPROST TROMETHAMINE 250 UG/ML
250 INJECTION, SOLUTION INTRAMUSCULAR
Status: DISCONTINUED | OUTPATIENT
Start: 2024-01-12 | End: 2024-01-12 | Stop reason: HOSPADM

## 2024-01-12 RX ORDER — IBUPROFEN 800 MG/1
800 TABLET, FILM COATED ORAL EVERY 6 HOURS PRN
Status: DISCONTINUED | OUTPATIENT
Start: 2024-01-12 | End: 2024-01-12

## 2024-01-12 RX ORDER — OXYTOCIN/0.9 % SODIUM CHLORIDE 30/500 ML
100-340 PLASTIC BAG, INJECTION (ML) INTRAVENOUS CONTINUOUS PRN
Status: DISCONTINUED | OUTPATIENT
Start: 2024-01-12 | End: 2024-01-12 | Stop reason: HOSPADM

## 2024-01-12 RX ADMIN — IBUPROFEN 800 MG: 800 TABLET ORAL at 03:00

## 2024-01-12 RX ADMIN — BENZOCAINE AND LEVOMENTHOL: 200; 5 SPRAY TOPICAL at 05:47

## 2024-01-12 RX ADMIN — ACETAMINOPHEN 650 MG: 325 TABLET, FILM COATED ORAL at 05:33

## 2024-01-12 ASSESSMENT — ACTIVITIES OF DAILY LIVING (ADL)
ADLS_ACUITY_SCORE: 20
ADLS_ACUITY_SCORE: 23
ADLS_ACUITY_SCORE: 20
ADLS_ACUITY_SCORE: 20

## 2024-01-12 NOTE — PROGRESS NOTES
S: Delivery  B: induced Labor,  @ 21mmr0viik gestation, GBS negative .  A: Patient delivered Vaginal at 0156 with Dr. Garrido in attendance. Baby delivered and placed on mother's low abdomen for delayed cord clamping where baby was dried and stimulated. After cord clamped and cut, baby was placed skin to skin on mother's chest within 5 minutes following delivery . Apgars 7/7. Placenta was delivered @ 0200 followed by administration of oxytocin. Bonding initiated with mom and baby. Baby taken to warmer at approximately 3 minutes of life for CPAP/PPV/suctioning. Baby then to nursery and transfer.  See flowsheet for VS and PP checks. Labor care plan goals met.  R: Expect routine postpartum care. Anticipate patient to discharge in a few hours to reunite with baby at NICU.

## 2024-01-12 NOTE — PROGRESS NOTES
Dr. Garrido called at 0054 and requested his presence for delivery.    Tanika Melgoza RN on 1/12/2024 at 12:55 AM

## 2024-01-12 NOTE — ANESTHESIA PROCEDURE NOTES
"Epidural catheter Procedure Note    Pre-Procedure   Staff -        Resident/Fellow: Krzysztof Mittal       CRNA: Charmaine Mckinney APRN CRNA       Performed By: CRNA and CHRISSIE       Location: OB       Procedure Start/Stop Times: 1/11/2024 10:50 PM and 1/11/2024 11:25 PM       Pre-Anesthestic Checklist: patient identified, IV checked, risks and benefits discussed, informed consent, monitors and equipment checked, pre-op evaluation and at physician/surgeon's request  Timeout:       Correct Patient: Yes        Correct Procedure: Yes        Correct Site: Yes        Correct Position: Yes   Procedure Documentation  Procedure: epidural catheter       Patient Position: sitting       Patient Prep/Sterile Barriers: sterile gloves, mask, patient draped       Skin prep: Betadine       Local skin infiltrated with 3 mL of 1% lidocaine.        Insertion Site: L3-4. (midline approach).       Technique: LORT air        Needle Type: Randal needle and Elizondo       Needle Gauge: 18.        Needle Length (Inches): 3.5        Catheter: 20 G.          Catheter threaded easily.         4 cm epidural space.           # of attempts: 1 and  # of redirects:     Assessment/Narrative         Paresthesias: No.       Test dose of 3 mL lidocaine 1.5% w/ 1:200,000 epinephrine at.         Test dose negative, 3 minutes after injection, for signs of intravascular, subdural, or intrathecal injection.       Insertion/Infusion Method: LORT air       Aspiration negative for Heme or CSF via Epidural Catheter.       Sensory Level Left: T6.       Sensory Level Right: T6.    Medication(s) Administered   Medication Administration Time: 1/11/2024 10:50 PM     Comments:  Epidural placed per Krzysztof DICKENS under the direct supervision of Charmaine Mckinney CRNA without  difficulty. Will follow as necessary.       FOR [facility] ([facility]) ONLY:   Pain Team Contact information: please page the Pain Team Via [employer]. Search \"Pain\". During daytime hours, please page the attending first. At " night please page the resident first.

## 2024-01-12 NOTE — PROGRESS NOTES
Paging for epidural per patient request, IV fluid bolus started. Adjusting stickers on [place] monitor

## 2024-01-12 NOTE — PROGRESS NOTES
Mom is starting to pump. Pump education given, explained what to expect etc. Patient and her mom verbalize understanding.

## 2024-01-12 NOTE — PROGRESS NOTES
Dr Garrido called or an update. Reviewed FHT, contraction pattern, emesis x1 with Reglan, shaking and breathing through contractions. Plan to give dose of Fentanyl, order reviewed with Dr Garrido. Will update MD in a couple hours or sooner as needed.

## 2024-01-12 NOTE — PROGRESS NOTES
Charmaine Mckinney CRNA and student at bedside to discuss epidural, consent and position before time out.

## 2024-01-12 NOTE — PROGRESS NOTES
0719 Pt discharged, belongings sent with pt. Discharge instructions instructed by MANUEL Alas RN. Pt discharged with mother.  transferred to [facility]

## 2024-01-12 NOTE — PLAN OF CARE
Goal Outcome Evaluation:    S: Discharge  to home    B: Patient had a Vaginal delivery with complications, had been covid + at end of December, has pink eye, baby needed resuscitation and transfer to [facility]. Baby girl Baby's name Aditi Pritchard, expressed breast milk. Support person's name Judith (pt's mom), Ciro (pt's dad), Gagandeep (boyfriend, not FOB), Sarah (friend).     A: Patient is a stable, early postpartum discharge per patient request and Dr Garrido approval.    R:  Discharge instructions reviewed and questions answered.  Belongings gathered and returned to the patient. Agreed to follow up in 6 weeks or sooner with any question or concerns.     Nursing Discharge Checklist:    Pneumovax screened and given, if appropriate: N/A  Influenza vaccine screened and given, if appropriate: N/A  Staples removed (): N/A  Breast milk returned: N/A  Hydrogel pads sent home:N/A  Birth Certificate Done: YES, sent down to registration  Public Health Referral Made: NO

## 2024-01-12 NOTE — PROGRESS NOTES
Report from Marian Ashby RN. Assume care of patient. Patient standing at bedside, rocking hips. Family is supportive at bedside.

## 2024-01-12 NOTE — DISCHARGE SUMMARY
[facility] Discharge Summary    Ely Pritchard MRN# [id number]   Age: 18 year old YOB: 2005     Date of Admission:  2024  Date of Discharge::  2024  Admitting Physician:  Marcelino Garrido MD  Discharge Physician:  Marcelino Garrido MD            Admission Diagnoses:   Normal labor [O80, Z37.9]  Principal Problem:    Normal labor  Active Problems:     (normal spontaneous vaginal delivery)               Discharge Diagnosis:     Principal Problem:    Normal labor  Active Problems:     (normal spontaneous vaginal delivery)               Procedures:     Procedure(s):             Medications Prior to Admission:     No medications prior to admission.             Discharge Medications:     Discharge Medication List as of 2024  6:14 AM        START taking these medications    Details   acetaminophen (TYLENOL) 325 MG tablet Take 2 tablets (650 mg) by mouth every 4 hours as needed for mild pain or fever (greater than or equal to 38  C /100.4  F (oral) or 38.5  C/ 101.4  F (core).), OTC      ibuprofen (ADVIL/MOTRIN) 800 MG tablet Take 1 tablet (800 mg) by mouth every 6 hours as needed for other (cramping), OTC           CONTINUE these medications which have NOT CHANGED    Details   FLUoxetine (PROZAC) 20 MG capsule Take 40 mg by mouth daily, Historical      Prenatal Vit-Fe Fumarate-FA (PNV PRENATAL PLUS MULTIVITAMIN) 27-1 MG TABS per tablet Take 1 tablet by mouth daily, Historical           STOP taking these medications       ondansetron (ZOFRAN ODT) 4 MG ODT tab Comments:   Reason for Stopping:         traZODone (DESYREL) 50 MG tablet Comments:   Reason for Stopping:                       Consultations:   No consultations were requested during this admission          Brief History of Labor or Admission:   Admitted for [facility] Course:   The patient's hospital course was unremarkable.  She had an uneventful . She recovered as anticipated and experienced  nopost-operative complications. On discharge, her pain was well controlled. Vaginal bleeding is similar to peak menstrual flow.  Voiding without difficulty.  Ambulating well and tolerating a normal diet.  No fever or significant wound drainage.  Breast feeding well.  Infant is stable. She was discharged on post-partum day #0. She was discharged failry quicly after delivery due to the need to be with her infant who was transferred due to resp distress. .    Post-partum hemoglobin:   Hemoglobin   Date Value Ref Range Status   01/12/2024 9.2 (L) 11.7 - 15.7 g/dL Final   08/25/2020 12.2 11.7 - 15.7 g/dL Final             Discharge Instructions and Follow-Up:     Discharge diet: Advance to a regular diet as tolerated   Discharge activity: No heavy lifting, pushing, pulling for 2 week(s)  Pelvic rest: abstain from intercourse and do not use tampons for 2 week(s) and pain free   Discharge follow-up: Follow up with primary care provider in 6-8 weeks, sooner if BP issues and directed by OB staff   Wound care: Drink plenty of fluids  Ice to area for comfort  Keep wound clean and dry           Discharge Disposition:     Discharged to home          Marcelino Garrido MD

## 2024-01-12 NOTE — ANESTHESIA PREPROCEDURE EVALUATION
Anesthesia Pre-Procedure Evaluation    Patient: Ely Pritchard   MRN: [id number] : 2005        Procedure : * No procedures listed *          Past Medical History:   Diagnosis Date     Anemia      Depressive disorder 2018     Mild persistent asthma     resolved     Pneumonia due to Mycoplasma pneumoniae 2007    hospitalized     Primary nocturnal enuresis      Unspecified sinusitis (chronic)       Past Surgical History:   Procedure Laterality Date     PE TUBES  12/29/2006    x  2      TONSILLECTOMY & ADENOIDECTOMY  2006     ZZHC CREATE EARDRUM OPENING,GEN ANESTH  09      Allergies   Allergen Reactions     Mold      Seasonal Allergies       Social History     Tobacco Use     Smoking status: Never     Passive exposure: Never     Smokeless tobacco: Never     Tobacco comments:     No smokers in home   Substance Use Topics     Alcohol use: No      Wt Readings from Last 1 Encounters:   24 97.4 kg (214 lb 12.8 oz)        Anesthesia Evaluation   Pt has had prior anesthetic. Type: General.        ROS/MED HX  ENT/Pulmonary:     (+)                     Intermittent, asthma  Treatment: Inhaler prn,                 Neurologic:       Cardiovascular:  - neg cardiovascular ROS     METS/Exercise Tolerance:     Hematologic:  - neg hematologic  ROS     Musculoskeletal:       GI/Hepatic:  - neg GI/hepatic ROS     Renal/Genitourinary:  - neg Renal ROS     Endo:       Psychiatric/Substance Use:  - neg psychiatric ROS     Infectious Disease:  - neg infectious disease ROS     Malignancy:  - neg malignancy ROS     Other:  - neg other ROS          Physical Exam    Airway  airway exam normal      Mallampati: II   TM distance: > 3 FB   Neck ROM: full   Mouth opening: > 3 cm    Respiratory Devices and Support         Dental  no notable dental history         Cardiovascular   cardiovascular exam normal       Rhythm and rate: regular and normal     Pulmonary   pulmonary exam normal        breath sounds clear to  "auscultation       OUTSIDE LABS:  CBC:   Lab Results   Component Value Date    WBC 13.9 (H) 01/11/2024    WBC 7.9 12/30/2023    HGB 10.6 (L) 01/11/2024    HGB 10.6 (L) 12/30/2023    HCT 31.7 (L) 01/11/2024    HCT 32.3 (L) 12/30/2023     01/11/2024     12/30/2023     BMP:   Lab Results   Component Value Date     08/25/2020     03/12/2020    POTASSIUM 3.6 08/25/2020    POTASSIUM 3.7 03/12/2020    CHLORIDE 108 08/25/2020    CHLORIDE 104 03/12/2020    CO2 26 08/25/2020    CO2 28 03/12/2020    BUN 10 08/25/2020    BUN 7 03/12/2020    CR 0.77 08/25/2020    CR 0.68 03/12/2020    GLC 85 08/25/2020    GLC 77 03/12/2020     COAGS: No results found for: \"PTT\", \"INR\", \"FIBR\"  POC:   Lab Results   Component Value Date    HCG Positive (A) 05/18/2023     HEPATIC:   Lab Results   Component Value Date    ALBUMIN 4.2 08/25/2020    PROTTOTAL 7.6 08/25/2020    ALT 25 08/25/2020    AST 19 08/25/2020    ALKPHOS 64 08/25/2020    BILITOTAL 0.1 (L) 08/25/2020     OTHER:   Lab Results   Component Value Date    A1C 5.5 03/12/2020    MAI 10.1 08/25/2020    LIPASE 139 08/25/2020    TSH 2.19 03/12/2020    T4 0.89 03/12/2020    T3 123 03/12/2020    CRP <2.9 08/25/2020    SED 7 08/25/2018       Anesthesia Plan    ASA Status:  2    NPO Status:  NPO Appropriate    Anesthesia Type: Epidural.              Consents    Anesthesia Plan(s) and associated risks, benefits, and realistic alternatives discussed. Questions answered and patient/representative(s) expressed understanding.     - Discussed:     - Discussed with:  Patient      - Extended Intubation/Ventilatory Support Discussed: No.      - Patient is DNR/DNI Status: No     Use of blood products discussed: No .     Postoperative Care    Pain management: Neuraxial analgesia, IV analgesics.        Comments:    Other Comments: The risks and benefits of anesthesia, and the alternatives where applicable, have been discussed with the patient, and they wish to proceed.              " "DEVANG Gallegos CRNA    I have reviewed the pertinent notes and labs in the chart from the past 30 days and (re)examined the patient.  Any updates or changes from those notes are reflected in this note.              # Obesity: Estimated body mass index is 34.67 kg/m  as calculated from the following:    Height as of 1/9/24: 1.676 m (5' 6\").    Weight as of 1/9/24: 97.4 kg (214 lb 12.8 oz).      "

## 2024-01-12 NOTE — ANESTHESIA POSTPROCEDURE EVALUATION
Patient: Ely Pritchard    Procedure: * No procedures listed *       Anesthesia Type:  Epidural    Note:  Disposition: Inpatient   Postop Pain Control: Uneventful            Sign Out: Well controlled pain   PONV: No   Neuro/Psych: Uneventful            Sign Out: Acceptable/Baseline neuro status   Airway/Respiratory: Uneventful            Sign Out: Acceptable/Baseline resp. status   CV/Hemodynamics: Uneventful            Sign Out: Acceptable CV status   Other NRE: NONE   DID A NON-ROUTINE EVENT OCCUR? No    Event details/Postop Comments:  Pt was happy with her anesthesia care.  No complications.  I advised the pt she may have some soreness at the epidural site and this is normal.  However if the soreness continues over a week or if redness is noted around the site to let anesthesia know.  I will follow up with the pt if needed.           Last vitals:  There were no vitals filed for this visit.    Electronically Signed By: DEVANG Barker CRNA  January 12, 2024  1:58 PM

## 2024-01-12 NOTE — L&D DELIVERY NOTE
Delivery Summary    lEy Pritchard MRN# [id number]   Age: 18 year old YOB: 2005     ASSESSMENT & PLAN:   Elective IOL. Healthy preg. Uneventful  with epidural. Baby requiring resuscitation likely RDS and planning for transfer. Placenta delivered intact. Small perineal lac that was closed with 3-0. Planning to discharge mom to be with baby. RH       Macho, Female-Ely [[id number]]      Labor Event Times      Latent labor onset date/time: 2024    Active labor onset date: 24 Onset time: 12:23 AM   Dilation complete date: 24 Complete time: 12:55 AM   Start pushing date/time: 2024 0125          Labor Length      1st Stage (hrs): 0 (min): 32   2nd Stage (hrs): 1 (min): 1   3rd Stage (hrs): 0 (min): 3          Labor Events     labor?: No   steroids: None  Labor Type: Induction/Cervical ripening, AROM  Predominate monitoring during 1st stage: continuous electronic fetal monitoring     Antibiotics received during labor?: No       Rupture date/time: 24   Rupture type: Artificial Rupture of Membranes  Fluid color: Clear  Fluid odor: Normal     Induction: Oxytocin  Induction date/time: 24    Cervical ripening date/time:      Indications for induction: Elective     Augmentation: Oxytocin, AROM  Indications for augmentation: Ineffective Contraction Pattern       Delivery/Placenta Date and Time      Delivery Date: 24 Delivery Time:  1:56 AM   Placenta Date/Time: 2024  2:00 AM  Oxytocin given at the time of delivery: after delivery of placenta  Delivering clinician: Marcelino Garrido MD   Other personnel present at delivery:  Provider Role   Dorothy Alas RN Felde, Jordana M, RN              Vaginal Counts       Initial count performed by 2 team members:  Two Team Members   JAMAL Garrido         Needles Suture Needles Sponges (RETIRED) Instruments   Initial counts 2  5    Added to count  1     Relief counts       Final  "counts 2 1 5            Placed during labor Accounted for at the end of labor   FSE NA NA   IUPC NA NA   Cervidil NA NA                  Final count performed by 2 team members:  Two Team Members   JAMAL Garrido      Final count correct?: Yes       Apgars    Living status: Living   1 Minute 5 Minute 10 Minute 15 Minute 20 Minute   Skin color: 0  1       Heart rate: 2  2       Reflex irritability: 2  2       Muscle tone: 1  1       Respiratory effort: 2  1       Total: 7  7              Cord      Vessels: 3 Vessels    Cord Complications: None               Cord Blood Disposition: Lab    Gases Sent?: No    Delayed cord clamping?: Yes    Cord Clamping Delay (seconds): 31-60 seconds           [place] Measurements      Weight: 7 lb 5.1 oz Length: 1' 8.25\"      Chest circumference: 34.3 cm          Labor Events and Shoulder Dystocia    Fetal Tracing Prior to Delivery: Category 2       Delivery (Maternal) (Provider to Complete) ([id number])    Episiotomy: None  Perineal lacerations: 1st    Repair suture: 3-0 Vicryl       Blood Loss  Mother: Ely Pritchard #[id number]     Start of Mother's Information      Delivery Blood Loss  24 0023 - 24 0309      None                 End of Mother's Information  Mother: Ely Pritchard #[id number]                Delivery - Provider to Complete ([id number])    Delivering clinician: Marcelino Garrido MD  Delivery Type (Choose the 1 that will go to the Birth History): Vaginal, Spontaneous                         Other personnel:  Provider Role   Dorothy Alas RN Felde, Jordana M, RN                     Placenta    Date/Time: 2024  2:00 AM  Removal: Spontaneous  Disposition: Hospital disposal             Anesthesia    Method: Epidural  Cervical dilation at placement: 4-7                    Presentation and Position       Occiput Anterior                     Marcelino Garrido MD  "

## 2024-01-13 NOTE — LACTATION NOTE
This note was copied from a baby's chart.  Lactation initial visit  Reason for visit/ call/ message:  Admitted from [facility] for intubation / respiratory distress    Supply:  Mother of infant pumping per hospital bedside RN  (NICU RN who talked with Mother of infant)    Skin to skin/ nuzzling/ latching:  Plan to try breastfeeding if Ok'd by the MD tomorrow per bedside RN    Education given:  Family not at bedside, infant 20 hours of age when rounding for education with family and Mother of infant already left, went home to rest.    Plan:  See tomorrow for full consult.        Florence Reyes, RN, IBCLC   Lactation Consultant  [facility]: [facility] [phone]  Office: [phone]  Ascom: [phone]

## 2024-01-15 ENCOUNTER — LACTATION ENCOUNTER (OUTPATIENT)
Dept: OTHER | Facility: CLINIC | Age: 19
End: 2024-01-15

## 2024-01-15 NOTE — PROGRESS NOTES
"Ely JOHN Pritchard  Gender: female  : 2005  [address]  [place] [id number] [phone] (home)   Medical Record: [id number]  Primary Care Provider: Janie Tai [facility]   Discharge Phone Call: Key Words/Key Times     How are you and the baby? Baby still at [facility]. Mom doing well, no pain.    How are feedings going? Baby was doing tube feedings. Breastfeeding initiated today, otherwise doing bottles. Milk is in. Lactation saw today and helped with the first latch    Voiding & Stooling? Baby at NICU    Any questions or concerns? None    Follow-up appointment? 6 weeks for mom- unsure when baby will be discharged and have follow-up    We want to provide excellent care here at [facility]. Do you have any feedback for us that would help us improve? \"Tasha and Marian- really encouraging and there for me. I wish they would have just broke my water and not did Pitocin though.\"  "

## 2024-01-16 ENCOUNTER — LACTATION ENCOUNTER (OUTPATIENT)
Dept: OTHER | Facility: CLINIC | Age: 19
End: 2024-01-16

## 2024-01-16 NOTE — LACTATION NOTE
"LACTATION DISCHARGE INSTRUCTIONS      Congratulations on your approaching discharge day!  Our goal is to help you have all the information, skills and equipment you need to help you meet your lactation goals at home.        CDC HANDOUT ON STORING AND PREPARING HUMAN MILK AT HOME    Please see attached handout   https://www.cdc.gov/breastfeeding/recommendations/handling_breastmilk.htm      FEEDING LOG: BABY'S FIRST WEEK, SECOND WEEK AND BEYOND    Please see attached feeding logs  Goal is to eat at least 8 times in 24 hours  Goal is to have at least 6 wet diapers in 24 hours  Talk to your provider about goal for soiled diapers.  Each baby is different depending on age and what they are eating      OTHER DISCHARGE INFORMATION    Medications:   Some women may find certain types of hormonal birth control, decongestants or antihistamines may impact supply-- talk to your provider.  Always get a second opinion from a lactation consultant or a provider familiar with lactation if told to stop latching or \"pump and dump\" when starting a new medication, having a procedure or you are ill; most of the time things are compatible.      TRANSITIONING TO MORE FEEDINGS AT HOME    Often, babies go home from the NICU doing a combination of breastfeeding and bottle feeding.  With time and patience, most will go on to nurse most or all their feedings.  infants, in particular, may not be able to fully nurse until at or after their due date. To ensure your baby is taking adequate volumes, some babies may need supplemental bottle after breastfeeding. Keep these things in mind as you nurse your baby at home:    Good time management is key!  Make feedings efficient so you have time to eat, sleep, and pump.    It is important to latch your baby frequently, even if he or she is taking small amounts. Staying skin to skin will also help keep your baby \"breast oriented\".  Going days without latching will make it more difficult.  Babies can " "be re-taught how to latch, but this is very time consuming and not always successful.        Please see a lactation consultant ASAP if you are not meeting your latching goal.  It is easier to make changes now, versus weeks or months down the road.        HOW TO WEAN FROM THE PUMP (AFTER YOUR BABY TAKES A FULL BREASTFEEDING)    Your milk supply may be greater than what your baby needs after discharge. It is important that you gradually wean from pumping after your baby takes a full breastfeeding (without needing a top-off).  If you wean too quickly, you will be uncomfortable and you run the risk of causing your supply to drop.    If you have been pumping less than two weeks:    If you are uncomfortable after a full breastfeeding, pump only until you are comfortable (versus pumping until empty)      If you have been pumping two weeks or more:    Continue to pump after every breastfeeding, but gradually decrease the time or volume you pump.   Example based on time: If you have been pumping 20 minutes after each full breastfeeding, decrease to 18 minutes for two days. If still comfortable, decrease to 16 minutes for another two days.   Example based on volume: If you normally pump 2 oz after a feeding, pump 1.75 oz for a few days, 1.5 oz for a few days, etc  Continue this way until you no longer need to pump (after breastfeeding).    Remember that if you are bottle feeding some feedings, you need to pump at the time you would have latched your baby. If you do not, you might start decreasing your milk supply.        OTHER LATCHING INFORMATION    Growth Spurts: Common times for \"growth spurts\" are around 7-10 days, 2-3 weeks, 4-6 weeks, 3 months, 4 months, 6 months and 9 months, but these vary widely between babies.  During these times allow your baby to nurse very frequently (or pump more frequently) to temporarily boost your supply, as opposed to supplementing.  It should pass in a few days when your supply increases, " "and your baby will settle into a new feeding pattern.    How to get a breastfeeding test weight scale:   Rental (2ml sensitivity):   [facility] ([facility]) [phone]   [place] [employer] ([facility]) [phone]  [employer]ipti [place]) [phone]   Purchase scale (6ml sensitivity):   \"Bae Baby Scale\" (Target, Amazon, etc), around $150      LACTATION SUPPORT    [place] [facility]  [phone] (Women's Services General Scheduling)    Alexandra Arciniega, DEVANG, CNM, IBCLC  Tuesday:  [facility],  8:30 - 5:00  Wednesday:  [facility], 7th floor, 8:30 - 4:00  Thursday:  [facility], [facility], 8:30 - 4:00    [facility]:  [phone] or [phone]    [facility]:  [phone]    [facility]:  [phone]    [facility]:  [phone]    [facility]:  [phone]    [facility]:  [phone] or [phone]    [facility]:  [phone]    [facility]:  [phone]    [facility]:  [phone]    [facility] or [facility]:  [phone]      Other Lactation Help:  [facility] [place] (Tuesday 1-2pm Infant Feeding Q&A)     [phone]  [place] Baby Weigh In (Thursday 9:30am Lactation Lounge)    www."Qnect, llc" ++HAS VIRTUAL SUPPORT++   Enlightened Mama   www.JoobilimaViacore [phone]  Home or in-office ([place])  Everyday Miracles         [email or web address]-[email or web address]/  [facility]     [phone] ++HAS VIRTUAL SUPPORT++   Janette Villela DO, MPH, [facility], IBCLC  Integrative Family Medicine Physician/Breastfeeding Medicine/ Home " "visits  www.[email or web address]  [phone]  [facility] \"Well Fed\" postpartum group ([facility])   [phone]    [facility] Lactation Support  [facility], Pediatrics & Adolescent Medicine: [phone], ext. [phone]. Outpatient appointments, phone assist   [facility] OBGYN, [phone]. Outpatient appointments, phone assist   [facility], [phone]. Inpatient services, outpatient appointments, phone assist   [facility] and [place], Inpatient services only   [facility], [phone]. Inpatient services, outpatient appointments, phone assist, 24-hour availability   [facility], [phone]. Inpatient services, outpatient appointments, phone assist   [facility], [phone], ext. [phone]. Inpatient services, phone assist -- Hours: 7 a.m. to 3:30 p.m. every day. After hours: Messages will be returned within 24 hours.    Telephone and Online Support    [facility] ++HAS VIRTUAL SUPPORT++ (call for eligibility information)   [phone]    BabyCafes (www.babycafeusa.org) (now in person)    La Leche [employer] International   ++HAS VIRTUAL SUPPORT++  www.UGOBEli.org  8-286-5-LA-LECHE ([phone])  Local referral line [phone]  Si quieres ayuda en espanol con issac pecho por favor jordanClifton Monica juárez [phone].    KellyMom-- up to date lactation information  Www.LeMond Fitnessmom.[email or web address]    International Breastfeeding [place] (Jean Merritt)  Http://ibconline.ca/    The InfantRisk Call Center is available to answer questions about the use of medications during pregnancy and while breastfeeding  [phone]  www.infantlancers Inc.Qbox.io     Office on Women's Health National Breastfeeding Help Line  8am to 5pm, English and Singaporean [phone] option 1    https://www.womenshealth.gov/breastfeeding/ Clbu9Zmec Bibi (free on iPhone bibi store or Google Play)    LactMed Bibi (free on iPhone bibi store or Google Play) LactMed is available online at " https://toxnet.nlm.nih.gov/newtoxnet/lactmed.htm and is now available on your mobile device. The free LactMed Bibi for iPhone/iPod Touch and Android can be downloaded at http://toxnet.nlm.nih.gov/help/lactmedapp.htm.

## 2024-01-16 NOTE — LACTATION NOTE
"This note was copied from a baby's chart.  LACTATION DISCHARGE INSTRUCTIONS      Congratulations on your approaching discharge day!  Our goal is to help you have all the information, skills and equipment you need to help you meet your lactation goals at home.        CDC HANDOUT ON STORING AND PREPARING HUMAN MILK AT HOME    Please see attached handout   https://www.cdc.gov/breastfeeding/recommendations/handling_breastmilk.htm      FEEDING LOG: BABY'S FIRST WEEK, SECOND WEEK AND BEYOND    Please see attached feeding logs  Goal is to eat at least 8 times in 24 hours  Goal is to have at least 6 wet diapers in 24 hours  Talk to your provider about goal for soiled diapers.  Each baby is different depending on age and what they are eating      OTHER DISCHARGE INFORMATION    Medications:   Some women may find certain types of hormonal birth control, decongestants or antihistamines may impact supply-- talk to your provider.  Always get a second opinion from a lactation consultant or a provider familiar with lactation if told to stop latching or \"pump and dump\" when starting a new medication, having a procedure or you are ill; most of the time things are compatible.      TRANSITIONING TO MORE FEEDINGS AT HOME    Often, babies go home from the NICU doing a combination of breastfeeding and bottle feeding.  With time and patience, most will go on to nurse most or all their feedings.  infants, in particular, may not be able to fully nurse until at or after their due date. To ensure your baby is taking adequate volumes, some babies may need supplemental bottle after breastfeeding. Keep these things in mind as you nurse your baby at home:    Good time management is key!  Make feedings efficient so you have time to eat, sleep, and pump.    It is important to latch your baby frequently, even if he or she is taking small amounts. Staying skin to skin will also help keep your baby \"breast oriented\".  Going days without latching " "will make it more difficult.  Babies can be re-taught how to latch, but this is very time consuming and not always successful.        Please see a lactation consultant ASAP if you are not meeting your latching goal.  It is easier to make changes now, versus weeks or months down the road.        HOW TO WEAN FROM THE PUMP (AFTER YOUR BABY TAKES A FULL BREASTFEEDING)    Your milk supply may be greater than what your baby needs after discharge. It is important that you gradually wean from pumping after your baby takes a full breastfeeding (without needing a top-off).  If you wean too quickly, you will be uncomfortable and you run the risk of causing your supply to drop.    If you have been pumping less than two weeks:    If you are uncomfortable after a full breastfeeding, pump only until you are comfortable (versus pumping until empty)      If you have been pumping two weeks or more:    Continue to pump after every breastfeeding, but gradually decrease the time or volume you pump.   Example based on time: If you have been pumping 20 minutes after each full breastfeeding, decrease to 18 minutes for two days. If still comfortable, decrease to 16 minutes for another two days.   Example based on volume: If you normally pump 2 oz after a feeding, pump 1.75 oz for a few days, 1.5 oz for a few days, etc  Continue this way until you no longer need to pump (after breastfeeding).    Remember that if you are bottle feeding some feedings, you need to pump at the time you would have latched your baby. If you do not, you might start decreasing your milk supply.        OTHER LATCHING INFORMATION    Growth Spurts: Common times for \"growth spurts\" are around 7-10 days, 2-3 weeks, 4-6 weeks, 3 months, 4 months, 6 months and 9 months, but these vary widely between babies.  During these times allow your baby to nurse very frequently (or pump more frequently) to temporarily boost your supply, as opposed to supplementing.  It should pass in " "a few days when your supply increases, and your baby will settle into a new feeding pattern.    How to get a breastfeeding test weight scale:   Rental (2ml sensitivity):   [facility] ([facility]) [phone]   [place] [employer] ([facility]) [phone]  [employer] [place]) [phone]   Purchase scale (6ml sensitivity):   \"Bae Baby Scale\" (Target, Amazon, etc), around $150      LACTATION SUPPORT    [place] [facility]  [phone] (Women's Services General Scheduling)    Alexandra Arciniega, DEVANG, CNM, IBCLC  Tuesday:  [facility],  8:30 - 5:00  Wednesday:  [facility], 7th floor, 8:30 - 4:00  Thursday:  [facility], [facility], 8:30 - 4:00    [facility]:  [phone] or [phone]    [facility]:  [phone]    [facility]:  [phone]    [facility]:  [phone]    [facility] 3]:  [phone]    [facility]:  [phone] or [phone]    [facility]:  [phone]    [facility]:  [phone]    [facility]:  [phone]    [facility]/[facility] or [facility]:  [phone]      Other Lactation Help:  [facility] [place] (Tuesday 1-2pm Infant Feeding Q&A)     [phone]  [place] Baby [facility]igh In (Thursday 9:30am Lactation Lounge)    www.ChirpVision ++HAS VIRTUAL SUPPORT++   Enlightened Michael   www.WerckerightenedmamaMipso [phone]  Home or in-office ([place])  Everyday Miracles         https://www.everydayLaboratÃ³rios Noliacles.org/  [facility]     [phone] ++HAS VIRTUAL SUPPORT++   Janette Villela DO, MPH, [facility], [facility]  Integrative Family Medicine Physician/Breastfeeding Medicine/ Home " "visits  www.Wiren Board  [phone]  [facility] \"Well Fed\" postpartum group ([facility])   [phone]    [facility] Lactation Support  [facility], Pediatrics & Adolescent Medicine: [phone], ext. [phone]. Outpatient appointments, phone assist   [facility], [phone]. Outpatient appointments, phone assist   [facility], [phone]. Inpatient services, outpatient appointments, phone assist   [facility] and [place], Inpatient services only   [facility], [phone]. Inpatient services, outpatient appointments, phone assist, 24-hour availability   [facility], [phone]. Inpatient services, outpatient appointments, phone assist   [facility], [phone], ext. [phone]. Inpatient services, phone assist -- Hours: 7 a.m. to 3:30 p.m. every day. After hours: Messages will be returned within 24 hours.    Telephone and Online Support    [facility] ++HAS VIRTUAL SUPPORT++ (call for eligibility information)   [phone]    BabyCafes (www.babycafeusa.org) (now in person)    La Leche League International   ++HAS VIRTUAL SUPPORT++  www.Kiwii Capitalli.org  3-113-9-LA-LECHE ([phone])  Local referral line [phone]  Si quieres ayuda en espanol con issac pecho por favor jordanIndianola Monica juárez [phone].    KellyMom-- up to date lactation information  Www.GnuBIOmom.Proximic    International Breastfeeding [place] (Jean Merritt)  Http://ibconline.ca/    The InfantRisk Call Center is available to answer questions about the use of medications during pregnancy and while breastfeeding  [phone]  www.infantMatchmaker Videos.Proximic     Office on Women's Health National Breastfeeding Help Line  8am to 5pm, English and British [phone] option 1    https://www.womenshealth.gov/breastfeeding/ Ancr3Htmr Bibi (free on iPhone bibi store or Google Play)    LactMed Bibi (free on iPhone bibi store or Google Play) LactMed is available online at " https://toxnet.nlm.nih.gov/newtoxnet/lactmed.htm and is now available on your mobile device. The free LactMed Bibi for iPhone/iPod Touch and Android can be downloaded at http://toxnet.nlm.nih.gov/help/lactmedapp.htm.

## 2024-01-16 NOTE — LACTATION NOTE
"This note was copied from a baby's chart.  Lactation Follow Up Note    Reason for visit/ call/ message:  Called into room for assistance with latching    Supply:  Pumping 35 mL Q3-4 hours    Significant changes (medications, equipment, comfort, etc):  Started latching today, used day shift lactation consultant for latching assistance earlier today    Skin to skin/ nuzzling/ latching:  Attempt latching skin-to-skin football hold onto left breast  Infant extremely frustrated, used a small amount of milk in bottle to placate infant 2-5 mL  Attempted latch again, infant refusing to latch and again quickly upsets  Attempted once more in cross-cradle; Ely needing assistance with positioning / holding breast tissue as her breasts are very large and difficult for her to hold while also holding infant.  After several minutes of attempted latch, moved to bottle and nipple with Ely doing the feeding through the bottle with practice of bottle feeding also as she seems unfamiliar with bottle feeding.  Skin-to-skin after feeding / infant had burped. Attempted  latching once more with infant \"Aditi\" showing some rooting cues and encouragement from lactation to try to latch for the continuation of practice with positioning and holding the infant now that she is quiet and content. Infant latched in the football hold on the right breast and had a couple of sucks and fell asleep.  Encouraged Ely to pump 10 - 15 minutes to replace the feeding with the bottle    Education given:  Transitioning to breast after bottle feedings / continuation of pumping to protect milk supply  Encouraged outpatient clinic visit with lactation at home    Plan:  Support for next feeding between 10-11:00 PM with lactation requested by Ely   Continue to breastfeed first / with each feeding as often as possible  Feedings first, then supplement if not latching - pump with any supplements given.        Florence Reyes, RN, IBCLC   Lactation Consultant  [place]: " [facility]ist [facility] [phone]  Office: [phone]  Ascom: *[phone]

## 2024-02-04 ENCOUNTER — HEALTH MAINTENANCE LETTER (OUTPATIENT)
Age: 19
End: 2024-02-04

## 2024-02-22 ENCOUNTER — E-VISIT (OUTPATIENT)
Dept: FAMILY MEDICINE | Facility: CLINIC | Age: 19
End: 2024-02-22
Payer: COMMERCIAL

## 2024-02-22 DIAGNOSIS — N61.0 MASTITIS: Primary | ICD-10-CM

## 2024-02-22 PROCEDURE — 99207 PR NO CHARGE LOS: CPT | Performed by: FAMILY MEDICINE

## 2024-02-23 RX ORDER — AMOXICILLIN 500 MG/1
500 CAPSULE ORAL 2 TIMES DAILY
Qty: 14 CAPSULE | Refills: 0 | Status: SHIPPED | OUTPATIENT
Start: 2024-02-23 | End: 2024-03-01

## 2024-02-23 NOTE — TELEPHONE ENCOUNTER
Patient notified of providers advice and where antibiotics are.     Sheila Canseco RN on 2/23/2024 at 4:53 PM

## 2024-02-23 NOTE — TELEPHONE ENCOUNTER
RN Triage    Patient Contact    Attempt # 1    Was call answered?  No.  Left message on voicemail with information to call me back.     Antibiotic sent to [facility].     Sounds like mastitis.  Please call and instruct her on taking her antibiotics, warm packing the warm area, and continue to breast-feed on both sides     Sheila Canseco RN on 2/23/2024 at 11:33 AM

## 2024-02-23 NOTE — TELEPHONE ENCOUNTER
Provider E-Visit time total (minutes): 5    Sounds like mastitis.  Please call and instruct her on taking her antibiotics, warm packing the warm area, and continue to breast-feed on both sides

## 2024-03-13 ENCOUNTER — MEDICAL CORRESPONDENCE (OUTPATIENT)
Dept: HEALTH INFORMATION MANAGEMENT | Facility: CLINIC | Age: 19
End: 2024-03-13

## 2024-03-13 ENCOUNTER — PRENATAL OFFICE VISIT (OUTPATIENT)
Dept: FAMILY MEDICINE | Facility: CLINIC | Age: 19
End: 2024-03-13
Payer: COMMERCIAL

## 2024-03-13 VITALS
RESPIRATION RATE: 12 BRPM | WEIGHT: 200 LBS | DIASTOLIC BLOOD PRESSURE: 70 MMHG | TEMPERATURE: 98.1 F | HEIGHT: 66 IN | BODY MASS INDEX: 32.14 KG/M2 | SYSTOLIC BLOOD PRESSURE: 110 MMHG | HEART RATE: 72 BPM | OXYGEN SATURATION: 99 %

## 2024-03-13 LAB — HGB BLD-MCNC: 11.1 G/DL (ref 11.7–15.7)

## 2024-03-13 PROCEDURE — 99207 PR POST PARTUM EXAM: CPT | Performed by: FAMILY MEDICINE

## 2024-03-13 PROCEDURE — 36415 COLL VENOUS BLD VENIPUNCTURE: CPT | Performed by: FAMILY MEDICINE

## 2024-03-13 ASSESSMENT — PAIN SCALES - GENERAL: PAINLEVEL: NO PAIN (0)

## 2024-03-13 NOTE — PROGRESS NOTES
"Ely is here for a 6-week postpartum checkup.    She had a  and induced vaginal delivery of a viable girl, weight 7 pounds 5 oz., with Fetal distress complications. Date of delivery was 24. Since delivery, she has not been breast feeding.  She has No signs of infection, bleeding or other complications.  She is not pregnant.  We discussed contraceptions and she has chosen none.      Post partum tubal: No  History of Gestational Diabetes? No  Type of Delivery:  Vaginal  Feeding Method:  Formula  If initiated breast feeding and stopped, how long did you breast feed?:  24    REVIEW OF SYSTEMS:      Contraception Plan: none         EXAM:  /70 (BP Location: Right arm, Patient Position: Chair, Cuff Size: Adult Large)   Pulse 72   Temp 98.1  F (36.7  C) (Temporal)   Resp 12   Ht 1.676 m (5' 6\")   Wt 90.7 kg (200 lb)   LMP 2023 (Exact Date)   SpO2 99%   Breastfeeding No   BMI 32.28 kg/m    HEENT: grossly normal.  NECK: no lymphadenopathy or thyroidomegaly.  LUNGS: CTA X 2, no rales or crackles.  BACK: No spinal or CVA tenderness.  HEART: RRR without murmurs clicks or gallops.  ABDOMEN: soft, non tender, good bowel sounds, without masses rebound, guarding or   EXTREMITIES:  warm to touch, good pulses, no ankle edema or calf tenderness.  NEUROLOGIC: grossly normal.    ASSESSMENT:   6-week postpartum exam after .  Hemoglobin   Date Value Ref Range Status   2024 11.1 (L) 11.7 - 15.7 g/dL Final   2020 12.2 11.7 - 15.7 g/dL Final   ]     PLAN:    Declines contraception. Enc wt loss/exercise. Enc 18 mo between pregnancies. Reheck hgb in 1 mo    rh    "

## 2024-06-17 ENCOUNTER — PATIENT OUTREACH (OUTPATIENT)
Dept: CARE COORDINATION | Facility: CLINIC | Age: 19
End: 2024-06-17
Payer: COMMERCIAL

## 2025-03-02 ENCOUNTER — HEALTH MAINTENANCE LETTER (OUTPATIENT)
Age: 20
End: 2025-03-02